# Patient Record
Sex: FEMALE | Race: OTHER | NOT HISPANIC OR LATINO | Employment: FULL TIME | URBAN - METROPOLITAN AREA
[De-identification: names, ages, dates, MRNs, and addresses within clinical notes are randomized per-mention and may not be internally consistent; named-entity substitution may affect disease eponyms.]

---

## 2018-05-18 ENCOUNTER — OFFICE VISIT (OUTPATIENT)
Dept: FAMILY MEDICINE | Facility: CLINIC | Age: 33
End: 2018-05-18
Payer: COMMERCIAL

## 2018-05-18 ENCOUNTER — LAB VISIT (OUTPATIENT)
Dept: LAB | Facility: HOSPITAL | Age: 33
End: 2018-05-18
Attending: FAMILY MEDICINE
Payer: COMMERCIAL

## 2018-05-18 VITALS
OXYGEN SATURATION: 96 % | DIASTOLIC BLOOD PRESSURE: 72 MMHG | HEIGHT: 60 IN | RESPIRATION RATE: 17 BRPM | HEART RATE: 85 BPM | SYSTOLIC BLOOD PRESSURE: 110 MMHG | WEIGHT: 115.5 LBS | TEMPERATURE: 99 F | BODY MASS INDEX: 22.68 KG/M2

## 2018-05-18 DIAGNOSIS — Z91.09 HOUSE DUST MITE ALLERGY: ICD-10-CM

## 2018-05-18 DIAGNOSIS — Z13.220 ENCOUNTER FOR LIPID SCREENING FOR CARDIOVASCULAR DISEASE: ICD-10-CM

## 2018-05-18 DIAGNOSIS — Z86.39 HISTORY OF HYPERLIPIDEMIA: ICD-10-CM

## 2018-05-18 DIAGNOSIS — Z13.6 ENCOUNTER FOR LIPID SCREENING FOR CARDIOVASCULAR DISEASE: ICD-10-CM

## 2018-05-18 DIAGNOSIS — Z00.00 ENCOUNTER FOR ROUTINE HISTORY AND PHYSICAL EXAM IN FEMALE PATIENT: ICD-10-CM

## 2018-05-18 DIAGNOSIS — Z00.00 ENCOUNTER FOR ROUTINE HISTORY AND PHYSICAL EXAM IN FEMALE PATIENT: Primary | ICD-10-CM

## 2018-05-18 PROBLEM — J30.2 SEASONAL ALLERGIES: Status: ACTIVE | Noted: 2018-05-18

## 2018-05-18 LAB
ALBUMIN SERPL BCP-MCNC: 3.7 G/DL
ALP SERPL-CCNC: 44 U/L
ALT SERPL W/O P-5'-P-CCNC: 11 U/L
ANION GAP SERPL CALC-SCNC: 9 MMOL/L
AST SERPL-CCNC: 17 U/L
BASOPHILS # BLD AUTO: 0.05 K/UL
BASOPHILS NFR BLD: 0.8 %
BILIRUB SERPL-MCNC: 0.4 MG/DL
BUN SERPL-MCNC: 13 MG/DL
CALCIUM SERPL-MCNC: 9.1 MG/DL
CHLORIDE SERPL-SCNC: 103 MMOL/L
CHOLEST SERPL-MCNC: 241 MG/DL
CHOLEST/HDLC SERPL: 3.7 {RATIO}
CO2 SERPL-SCNC: 26 MMOL/L
CREAT SERPL-MCNC: 0.8 MG/DL
DIFFERENTIAL METHOD: ABNORMAL
EOSINOPHIL # BLD AUTO: 0.3 K/UL
EOSINOPHIL NFR BLD: 5.1 %
ERYTHROCYTE [DISTWIDTH] IN BLOOD BY AUTOMATED COUNT: 12.7 %
EST. GFR  (AFRICAN AMERICAN): >60 ML/MIN/1.73 M^2
EST. GFR  (NON AFRICAN AMERICAN): >60 ML/MIN/1.73 M^2
GLUCOSE SERPL-MCNC: 86 MG/DL
HCT VFR BLD AUTO: 35.8 %
HDLC SERPL-MCNC: 65 MG/DL
HDLC SERPL: 27 %
HGB BLD-MCNC: 12 G/DL
LDLC SERPL CALC-MCNC: 146.4 MG/DL
LYMPHOCYTES # BLD AUTO: 1.7 K/UL
LYMPHOCYTES NFR BLD: 27.9 %
MCH RBC QN AUTO: 29.6 PG
MCHC RBC AUTO-ENTMCNC: 33.5 G/DL
MCV RBC AUTO: 88 FL
MONOCYTES # BLD AUTO: 0.5 K/UL
MONOCYTES NFR BLD: 8.1 %
NEUTROPHILS # BLD AUTO: 3.4 K/UL
NEUTROPHILS NFR BLD: 57.9 %
NONHDLC SERPL-MCNC: 176 MG/DL
PLATELET # BLD AUTO: 276 K/UL
PMV BLD AUTO: 10.2 FL
POTASSIUM SERPL-SCNC: 4.2 MMOL/L
PROT SERPL-MCNC: 7.5 G/DL
RBC # BLD AUTO: 4.06 M/UL
SODIUM SERPL-SCNC: 138 MMOL/L
TRIGL SERPL-MCNC: 148 MG/DL
TSH SERPL DL<=0.005 MIU/L-ACNC: 2.05 UIU/ML
WBC # BLD AUTO: 5.92 K/UL

## 2018-05-18 PROCEDURE — 80053 COMPREHEN METABOLIC PANEL: CPT

## 2018-05-18 PROCEDURE — 85025 COMPLETE CBC W/AUTO DIFF WBC: CPT

## 2018-05-18 PROCEDURE — 3008F BODY MASS INDEX DOCD: CPT | Mod: CPTII,S$GLB,, | Performed by: FAMILY MEDICINE

## 2018-05-18 PROCEDURE — 99385 PREV VISIT NEW AGE 18-39: CPT | Mod: S$GLB,,, | Performed by: FAMILY MEDICINE

## 2018-05-18 PROCEDURE — 99999 PR PBB SHADOW E&M-EST. PATIENT-LVL III: CPT | Mod: PBBFAC,,, | Performed by: FAMILY MEDICINE

## 2018-05-18 PROCEDURE — 36415 COLL VENOUS BLD VENIPUNCTURE: CPT | Mod: PN

## 2018-05-18 PROCEDURE — 84443 ASSAY THYROID STIM HORMONE: CPT

## 2018-05-18 PROCEDURE — 80061 LIPID PANEL: CPT

## 2018-05-18 RX ORDER — DIPHENHYDRAMINE HCL 50 MG
50 CAPSULE ORAL EVERY 6 HOURS PRN
Status: ON HOLD | COMMUNITY
End: 2020-04-02 | Stop reason: HOSPADM

## 2018-05-18 NOTE — PROGRESS NOTES
Chief Complaint   Patient presents with    Annual Exam       HPI    Alisa Grigsby is 33 y.o. female. The primary encounter diagnosis was Encounter for routine history and physical exam in female patient. Diagnoses of House dust mite allergy and History of hyperlipidemia were also pertinent to this visit.    33 year old female comes to clinic to establish care and for annual exam.  Patient reports Seasonal allergies and a dust mite allergy.  She reports nightly hives controlled with Benadryl.  She has tried mitigation techniques but has been unable to completely control and resolve the issue.    She reports a family history of HLD and a sister with Hypothyroidism.  She denies any other medical issues or concerns for discussion today.    Review of Systems   Constitutional: Negative for activity change and unexpected weight change.   HENT: Negative for hearing loss, rhinorrhea and trouble swallowing.    Eyes: Negative for discharge and visual disturbance.   Respiratory: Negative for chest tightness and wheezing.    Cardiovascular: Negative for chest pain and palpitations.   Gastrointestinal: Negative for blood in stool, constipation, diarrhea and vomiting.   Endocrine: Negative for polydipsia and polyuria.   Genitourinary: Negative for difficulty urinating, dysuria, hematuria and menstrual problem.   Musculoskeletal: Negative for arthralgias, joint swelling and neck pain.   Neurological: Negative for weakness and headaches.   Psychiatric/Behavioral: Negative for confusion and dysphoric mood.       Past Medical History:   Diagnosis Date    Allergy      History reviewed. No pertinent surgical history.  Family History   Problem Relation Age of Onset    Hyperlipidemia Mother     Hyperlipidemia Father     Hypothyroidism Sister     No Known Problems Brother     No Known Problems Daughter     Stroke Paternal Grandmother     Breast cancer Neg Hx     Colon cancer Neg Hx     Ovarian cancer Neg Hx       reports  "that she has never smoked. She has never used smokeless tobacco. She reports that she does not drink alcohol or use drugs.  Review of patient's allergies indicates:   Allergen Reactions    Ibuprofen          Current Outpatient Prescriptions:     diphenhydrAMINE (BENADRYL) 50 MG capsule, Take 50 mg by mouth every 6 (six) hours as needed for Itching., Disp: , Rfl:     norelgestromin-ethinyl estradiol (XULANE) 150-35 mcg/24 hr, Place 1 patch onto the skin every 7 days., Disp: 9 patch, Rfl: 4    valACYclovir (VALTREX) 1000 MG tablet, TAKE 2 TABLETS BY MOUTH EVERY 12 (TWELVE) HOURS., Disp: 30 tablet, Rfl: 5        Blood pressure 110/72, pulse 85, temperature 98.6 °F (37 °C), temperature source Oral, resp. rate 17, height 5' 0.24" (1.53 m), weight 52.4 kg (115 lb 8.3 oz), last menstrual period 05/09/2018, SpO2 96 %, currently breastfeeding.    Physical Exam   Constitutional: She is oriented to person, place, and time. Vital signs are normal. She appears well-developed.   HENT:   Right Ear: Hearing normal.   Left Ear: Hearing normal.   Mouth/Throat: Normal dentition.   Cardiovascular: Normal heart sounds and intact distal pulses.    Pulmonary/Chest: Effort normal and breath sounds normal.   Abdominal: Soft. Bowel sounds are normal. There is no tenderness.   Musculoskeletal:   Normal gait. No decreased ROM at all 4 major joints.   Neurological: She is oriented to person, place, and time. She has normal strength. No sensory deficit.   Skin: Skin is intact. No rash noted.   Psychiatric: She has a normal mood and affect. Her speech is normal.       Office Visit on 01/29/2018   Component Date Value Ref Range Status    SOURCE: 01/29/2018 Cervical   Final    Slides: 01/29/2018 1   Final    LMP: 01/29/2018 NOT GIVEN   Final    Specimen adequacy: 01/29/2018 (NOTE)   Final    Interpretation 01/29/2018 NO EPITHELIAL ABNORMALITY SEE BELOW   Final    Comments: 01/29/2018 (NOTE)   Final    Cytotechnologist: 01/29/2018 Cristobal " KEVIN Mcleod(ASCP)   Final    LOCATION 01/29/2018 (NOTE)   Final    CPT Codes: 01/29/2018 (NOTE)   Final    HPV HIGH RISK IF ASC-US, THINPREP 01/29/2018 CRITERIA NOT MET   Final   ]    ASSESSMENT:    1. Encounter for routine history and physical exam in female patient    2. House dust mite allergy    3. History of hyperlipidemia        Alisa was seen today for annual exam.    Diagnoses and all orders for this visit:    Encounter for routine history and physical exam in female patient  -     CBC auto differential; Future  -     Comprehensive metabolic panel; Future  -     TSH; Future  - Baseline labs ordered. Address any issues on lab work.     History of hyperlipidemia   -     Lipid panel; Future  -     TSH; Future  - New problem. Family and personal history of hyperlipidemia. Obtain lipids and adjust as needed.    House dust mite allergy    -New problem. Continue use of Benadryl nightly. Monitor for any worsening of allergy not controlled with antihistamine.        FOLLOW UP: Follow-up in about 1 year (around 5/18/2019) for Annual visit.

## 2018-05-18 NOTE — PATIENT INSTRUCTIONS
Prevention Guidelines, Women Ages 18 to 39  Screening tests and vaccines are an important part of managing your health. Health counseling is essential, too. Below are guidelines for these, for women ages 18 to 39. Talk with your healthcare provider to make sure youre up-to-date on what you need.  Screening Who needs it How often   Alcohol misuse All women in this age group At routine exams   Blood pressure All women in this age group Every 2 years if your blood pressure is less than 120/80 mm Hg; yearly if your systolic blood pressure is 120 to 139 mm Hg, or your diastolic blood pressure reading is 80 to 89 mm Hg   Breast cancer All women in this age group should talk with their healthcare providers about the need for clinical breast exams (CBE)1 Clinical breast exam every 3 years1   Cervical cancer Women ages 21 and older Women between ages 21 and 29 should have a Pap test every 3 years; women between ages 30 and 65 are advised to have a Pap test plus an HPV test every 5 years   Chlamydia Sexually active women ages 24 and younger, and women at increased risk for infection Every 3 years if you're at risk or have symptoms   Depression All women in this age group At routine exams   Diabetes mellitus, type 2 Adults with no symptoms who are overweight or obese and have 1 or more other risk factors for diabetes At least every 3 years   Gonorrhea Sexually active women at increased risk for infection At routine exams   Hepatitis C Anyone at increased risk At routine exams   HIV All women At routine exams   Obesity All women in this age group At routine exams   Syphilis Women at increased risk for infection - talk with your healthcare provider At routine exams   Tuberculosis Women at increased risk for infection - talk with your healthcare provider Ask your healthcare provider   Vision All women in this age group At least 1 complete exam in your 20s, and 2 in your 30s   Vaccine2 Who needs it How often   Chickenpox  (varicella) All women in this age group who have no record of this infection or vaccine 2 doses; the second dose should be given 4 to 8 weeks after the first dose   Hepatitis A Women at increased risk for infection - talk with your healthcare provider 2 doses given at least 6 months apart   Hepatitis B Women at increased risk for infection - talk with your healthcare provider 3 doses over 6 months; second dose should be given 1 month after the first dose; the third dose should be given at least 2 months after the second dose and at least 4 months after the first dose   Haemophilus influenzae Type B (HIB) Women at increased risk for infection - talk with your healthcare provider 1 to 3 doses   Human papillomavirus (HPV) All women in this age group up to age 26 3 doses; the second dose should be given 1 to 2 months after the first dose and the third dose given 6 months after the first dose   Influenza (flu) All women in this age group Once a year   Measles, mumps, rubella (MMR) All women in this age group who have no record of these infections or vaccines 1 or 2 doses   Meningococcal Women at increased risk for infection - talk with your healthcare provider 1 or more doses   Pneumococcal conjugate vaccine (PCV13) and pneumococcal polysaccharide vaccine (PPSV23) Women at increased risk for infection - talk with your healthcare provider PCV13: 1 dose ages 19 to 65 (protects against 13 types of pneumococcal bacteria)  PPSV23: 1 to 2 doses through age 64, or 1 dose at 65 or older (protects against 23 types of pneumococcal bacteria)      Tetanus/diphtheria/pertussis (Td/Tdap) booster All women in this age group Td every 10 years, or a one-time dose of Tdap instead of a Td booster after age 18, then Td every 10 years   Counseling Who needs it How often   BRCA gene mutation testing for breast and ovarian cancer susceptibility Women with increased risk for having gene mutation When your risk is known   Breast cancer and  chemoprevention Women at high risk for breast cancer When your risk is known   Diet and exercise Women who are overweight or obese When diagnosed, and then at routine exams   Domestic violence Women at the age in which they are able to have children At routine exams   Sexually transmitted infection prevention Women who are sexually active At routine exams   Skin cancer Prevention of skin cancer in fair-skinned adults through age 24 At routine exams   Use of tobacco and the health effects it can cause All women in this age group Every visit   1According to the ACS, women ages 20 to 39 years should have a clinical breast exam (CBE) as part of their routine health exam every 3 years. Breast self-exams are an option for women starting in their 20s. But the  USPSTF does not recommend CBE.  2Those who are 18 years old and not up-to-date on their childhood vaccines should get all appropriate catch-up vaccines recommended by the CDC.  Date Last Reviewed: 8/27/2015  © 0716-4161 [employer] [address] [place] [address], [place] [address]. All rights reserved. This information is not intended as a substitute for professional medical care. Always follow your healthcare professional's instructions.

## 2018-05-29 ENCOUNTER — PATIENT MESSAGE (OUTPATIENT)
Dept: FAMILY MEDICINE | Facility: CLINIC | Age: 33
End: 2018-05-29

## 2018-08-13 ENCOUNTER — PATIENT MESSAGE (OUTPATIENT)
Dept: FAMILY MEDICINE | Facility: CLINIC | Age: 33
End: 2018-08-13

## 2018-08-13 DIAGNOSIS — Z91.09 HOUSE DUST MITE ALLERGY: Primary | ICD-10-CM

## 2018-08-13 RX ORDER — TRIAMCINOLONE ACETONIDE 5 MG/G
OINTMENT TOPICAL 2 TIMES DAILY
Qty: 60 G | Refills: 2 | Status: SHIPPED | OUTPATIENT
Start: 2018-08-13 | End: 2024-03-09

## 2018-12-07 ENCOUNTER — PATIENT MESSAGE (OUTPATIENT)
Dept: FAMILY MEDICINE | Facility: CLINIC | Age: 33
End: 2018-12-07

## 2018-12-07 RX ORDER — PENCICLOVIR 10 MG/G
CREAM TOPICAL
Qty: 5 G | Refills: 2 | Status: SHIPPED | OUTPATIENT
Start: 2018-12-07

## 2019-08-14 ENCOUNTER — OFFICE VISIT (OUTPATIENT)
Dept: FAMILY MEDICINE | Facility: CLINIC | Age: 34
End: 2019-08-14
Payer: COMMERCIAL

## 2019-08-14 VITALS
HEIGHT: 60 IN | DIASTOLIC BLOOD PRESSURE: 69 MMHG | HEART RATE: 87 BPM | SYSTOLIC BLOOD PRESSURE: 107 MMHG | TEMPERATURE: 99 F | OXYGEN SATURATION: 98 % | BODY MASS INDEX: 22.47 KG/M2 | RESPIRATION RATE: 17 BRPM | WEIGHT: 114.44 LBS

## 2019-08-14 DIAGNOSIS — Z00.00 WELL WOMAN EXAM (NO GYNECOLOGICAL EXAM): Primary | ICD-10-CM

## 2019-08-14 PROCEDURE — 99395 PR PREVENTIVE VISIT,EST,18-39: ICD-10-PCS | Mod: S$GLB,,, | Performed by: FAMILY MEDICINE

## 2019-08-14 PROCEDURE — 99395 PREV VISIT EST AGE 18-39: CPT | Mod: S$GLB,,, | Performed by: FAMILY MEDICINE

## 2019-08-14 PROCEDURE — 99999 PR PBB SHADOW E&M-EST. PATIENT-LVL III: ICD-10-PCS | Mod: PBBFAC,,, | Performed by: FAMILY MEDICINE

## 2019-08-14 PROCEDURE — 99999 PR PBB SHADOW E&M-EST. PATIENT-LVL III: CPT | Mod: PBBFAC,,, | Performed by: FAMILY MEDICINE

## 2019-08-14 NOTE — PROGRESS NOTES
"Well Woman VISIT      CHIEF COMPLAINT  Chief Complaint   Patient presents with    [facility] Care    Annual Exam       HPI  Alisa Grigsby is a 34 y.o. female who presents for physical.     Social Factors  Tobacco use: No  Ready to Quit: No  Alcohol: No  Intimate partner violence screening  "Do you feel safe in your current relationship?" Yes   "Have you ever been in a relationship in which your partner frightened you or hurt you?" No  Living Will/POA: No  Regular Exercise: No    Depression  Over the past two weeks, have you felt down, depressed, or hopeless? No  Over the past two weeks, have you felt little interest or pleasure in doing things? No    Reproductive Health  Followed by Dr. Graff    CHD, HTN, DM2  CHD Risk Factors: dyslipidemia  Women 45 years and older should be screened for dyslipidemia if at increased risk of CHD  Women 20 to 45 years of age should be screened for dyslipidemia if at increased risk of CHD  Asymptomatic adults with sustained blood pressure greater than 135/80 mm Hg (treated or untreated) should be screened for type 2 diabetes mellitus    Estimated body mass index is 22.17 kg/m² as calculated from the following:    Height as of this encounter: 5' 0.24" (1.53 m).    Weight as of this encounter: 51.9 kg (114 lb 6.7 oz).      Screening  Mammogram needed: n/a  Colonoscopy needed: n/a  Osteoporosis screen needed: n/a     Women 50 to 74 years of age should be screened for breast cancer with mammography biennially.  Women should be screened for cervical cancer with Pap tests beginning at 21 years of age. Low-risk women should receive Pap testing every three years. Co-testing for human papillomavirus is an option beginning at 30 years of age, and can extend the screening interval to five years. Cervical cancer screening should be discontinued at 65 years of age or after total hysterectomy if the woman has a benign gynecologic history  Adults 50 to 75 years of age should be screened " "for colorectal cancer with an FOBT annually, sigmoidoscopy every five years with an FOBT every three years, or colonoscopy every 10 years.  Women 65 years and older should be screened for osteoporosis. Women younger than 65 years should be screened if the risk of fracture is greater than or equal to that of a 65-year-old white woman without additional risk factors.      Immunizations  delayed    ALLERGIES and MEDS were verified.   PMHx, PSHx, FHx, SOCIALHx were updated as pertinent.    REVIEW OF SYSTEMS  Review of Systems   Constitutional: Negative.    HENT: Negative for hearing loss.    Eyes: Negative for discharge.   Respiratory: Negative for wheezing.    Cardiovascular: Negative for chest pain and palpitations.   Gastrointestinal: Negative for blood in stool, constipation, diarrhea and vomiting.   Genitourinary: Negative for dysuria and hematuria.   Musculoskeletal: Negative for neck pain.   Skin: Negative.    Neurological: Negative for weakness and headaches.   Endo/Heme/Allergies: Negative for polydipsia.         PHYSICAL EXAM  VITAL SIGNS: /69 (BP Location: Left arm, Patient Position: Sitting, BP Method: Medium (Automatic))   Pulse 87   Temp 98.8 °F (37.1 °C) (Oral)   Resp 17   Ht 5' 0.24" (1.53 m)   Wt 51.9 kg (114 lb 6.7 oz)   LMP 07/19/2019   SpO2 98%   BMI 22.17 kg/m²   GEN: Well developed, Well nourished, No acute distress.  HENT: Normocephalic, Atraumatic, Bilateral external ears normal, Nose normal, Oropharynx moist, No oral exudates.   Eyes: PERRL, EOMI, Conjunctiva normal, No discharge.   Neck: Supple, No tenderness.  Lymphatic: No cervical or supraclavicular lymphadenopathy noted.   Cardiovascular: Normal heart rate, Normal rhythm, No murmurs, No rubs, No gallops.   Thorax & Lungs: Normal breath sounds, No respiratory distress, No wheezing.  Abdomen: Soft, No tenderness, Bowel sounds normal.  Breast: deferred  Genital: deferred   Skin: Warm, Dry, No erythema, No rash.   Extremities: No " edema, No tenderness.       ASSESSMENT/PLAN    Alisa was seen today for establish care and annual exam.    Diagnoses and all orders for this visit:    Well woman exam (no gynecological exam)  -     CBC auto differential; Future  -     Comprehensive metabolic panel; Future  -     Lipid panel; Future  -     Hemoglobin A1c; Future  -     Urinalysis; Future  -     TSH; Future           FOLLOW UP: 1 year or sooner if needed      Gwyn Stein MD

## 2019-09-20 PROBLEM — B00.2 PRIMARY HSV INFECTION OF MOUTH: Status: ACTIVE | Noted: 2019-09-20

## 2019-12-06 PROBLEM — O44.02 PLACENTA PREVIA IN SECOND TRIMESTER: Status: ACTIVE | Noted: 2019-12-06

## 2019-12-10 DIAGNOSIS — Z36.89 ENCOUNTER FOR FETAL ANATOMIC SURVEY: Primary | ICD-10-CM

## 2019-12-16 ENCOUNTER — INITIAL CONSULT (OUTPATIENT)
Dept: MATERNAL FETAL MEDICINE | Facility: CLINIC | Age: 34
End: 2019-12-16
Payer: COMMERCIAL

## 2019-12-16 VITALS — WEIGHT: 130.06 LBS | SYSTOLIC BLOOD PRESSURE: 116 MMHG | BODY MASS INDEX: 25.2 KG/M2 | DIASTOLIC BLOOD PRESSURE: 71 MMHG

## 2019-12-16 DIAGNOSIS — Z36.89 ENCOUNTER FOR FETAL ANATOMIC SURVEY: ICD-10-CM

## 2019-12-16 DIAGNOSIS — O44.02 PLACENTA PREVIA IN SECOND TRIMESTER: ICD-10-CM

## 2019-12-16 PROCEDURE — 99999 PR PBB SHADOW E&M-EST. PATIENT-LVL II: CPT | Mod: PBBFAC,,, | Performed by: OBSTETRICS & GYNECOLOGY

## 2019-12-16 PROCEDURE — 76805 OB US >/= 14 WKS SNGL FETUS: CPT | Mod: S$GLB,,, | Performed by: OBSTETRICS & GYNECOLOGY

## 2019-12-16 PROCEDURE — 76805 PR US, OB 14+WKS, TRANSABD, SINGLE GESTATION: ICD-10-PCS | Mod: S$GLB,,, | Performed by: OBSTETRICS & GYNECOLOGY

## 2019-12-16 PROCEDURE — 99202 PR OFFICE/OUTPT VISIT, NEW, LEVL II, 15-29 MIN: ICD-10-PCS | Mod: 25,S$GLB,, | Performed by: OBSTETRICS & GYNECOLOGY

## 2019-12-16 PROCEDURE — 99999 PR PBB SHADOW E&M-EST. PATIENT-LVL II: ICD-10-PCS | Mod: PBBFAC,,, | Performed by: OBSTETRICS & GYNECOLOGY

## 2019-12-16 PROCEDURE — 99202 OFFICE O/P NEW SF 15 MIN: CPT | Mod: 25,S$GLB,, | Performed by: OBSTETRICS & GYNECOLOGY

## 2019-12-16 NOTE — LETTER
December 16, 2019      Cristobal Martin MD  [address]  [address]  [place] [address]           MATERNAL AND FETAL MEDICINE  [address]  [place] [address]  Phone: [phone]  Fax: [phone]          Patient: Alisa Grigsby   MR Number: [id number]   YOB: 1985   Date of Visit: 12/16/2019       Dear Dr. Cristobal Martin:    Thank you for referring Alisa Grigsby to me for evaluation. Attached you will find relevant portions of my assessment and plan of care.    If you have questions, please do not hesitate to call me. I look forward to following Alisa Grigsby along with you.    Sincerely,    Wanda Douglas MD    Enclosure  CC:  No Recipients    If you would like to receive this communication electronically, please contact [email or web address] or [phone] to request more information on [employer] access.    For providers and/or their staff who would like to refer a patient to [facility], please contact us through our one-stop-shop provider referral line, [facility], at [phone].    If you feel you have received this communication in error or would no longer like to receive these types of communications, please e-mail [email or web address]

## 2019-12-17 NOTE — PROGRESS NOTES
Indication  ========    Consultation. Evaluation for placenta previa. Fetal anatomy survey    History  ======    Previous Outcomes   2  Para 1    Maternal Assessment  =================    BP syst 116 mmHg  BP diast 71 mmHg    Method  ======    Transabdominal ultrasound examination, 2D Color Doppler, Voluson E6, . View: Good view    Pregnancy  =========    Pringle pregnancy. Number of fetuses: 1    Dating  ======    Cycle: regular cycle, regular cycle  GA by prior assessment 21 w + 4 d  CLEO by prior assessment: 2020  Ultrasound examination on: 2019  GA by U/S based upon: AC, BPD, Femur, HC  GA by U/S 22 w + 0 d  CLEO by U/S: 2020  Assigned: based on stated CLEO, selected on 2019  Assigned GA 21 w + 4 d  Assigned CLEO: 2020  Pregnancy length 280 d    General Evaluation  ==============    Cardiac activity present.  bpm.  Fetal movements visualized.  Presentation breech.  Placenta Placental site: complete previa. (see impression).  Umbilical cord Cord vessels: 3 vessel cord.  Amniotic fluid Amount of AF: normal amount.    Fetal Biometry  ============    Standard  BPD 53.2 mm  22w 1d                Hadlock    OFD 65.4 mm  22w 1d                Mai    .3 mm  21w 2d                Hadlock    Cerebellum tr 23.0 mm  22w 2d                Deluca    .0 mm  23w 0d                Hadlock    Femur 35.7 mm  21w 2d                Hadlock    Humerus 34.8 mm  21w 6d                Mai    HC / AC 1.05     g          52%        Alphonso    EFW (lb) 1 lb  EFW (oz) 1 oz  EFW by: Hadlock (BPD-HC-AC-FL)  Extended   8.1 mm  CM 4.9 mm    Head / Face / Neck  Cephalic index 0.81    Extremities / Bony Struc  FL / BPD 0.67    FL / HC 0.19    FL / AC 0.20    Other Structures   bpm    Fetal Anatomy  ============    Cranium: normal  Lateral ventricles: normal  Choroid plexus: normal  Midline falx: normal  Cavum septi pellucidi: normal  Cerebellum: normal  Cisterna  magna: normal  Head / Neck  Vermis: normal  Neck: normal  Nuchal fold: not examined  Lips: normal  Profile: normal  Nose: normal  RVOT view: suboptimal  LVOT view: normal  Heart / Thorax  4-chamber view: 4-chamber normal, septum normal  Situs: situs solitus (normal)  Aortic arch view: normal  Ductal arch view: normal  SVC: normal  IVC: normal  3-vessel view: normal  3-vessel-trachea view: suboptimal  Rt lung: normal  Lt lung: normal  Diaphragm: normal  Cord insertion: normal  Stomach: normal  Kidneys: normal  Bladder: normal  Genitals: normal  Abdomen  Liver: normal  Bowel: normal  Rt renal artery: normal  Lt renal artery: normal  Cervical spine: normal  Thoracic spine: normal  Lumbar spine: normal  Sacral spine: normal  Rt arm: normal  Lt arm: normal  Rt leg: normal  Lt leg: normal  Position of hands: normal  Position of feet: normal  Gender: male  Wants to know gender: yes    Maternal Structures  ===============    Uterus / Cervix  Uterus: Normal  Cervical length 33.0 mm  Ovaries / Tubes / Adnexa  Rt ovary: Normal  Lt ovary: Normal    Consultation  ==========    Type: Complete previa  The patient is a 34-year-old  001 who is currently at 21 weeks and 4 days gestation based on LMP agreeing with a 10 week ultrasound  who presents as a consultation from Dr. anderson in due to a complete percent of previa. The patient did not reveal any complications thus far with  the pregnancy.    Past medical history: None per patient  past surgical history: [place] teeth extraction  social: Denies smoking, alcohol this drug use  family history of birth defects: None- the father of the baby has Factor 9 deficiency which is severe, his two sisters have lupus and Sjogren's  respectively and his father is diabetic, couple's daughter is healthy - their pediatrician is aware of his factor 9 deficiency and that the daughter  should be a carrier of this. The patient and  were informed that carriers can have symptoms in issues and  also concerns with respect  to pregnancy and recommend the address this with their pediatrician.  Allergies: Ibuprofen, NSAIDs which leads to swelling around the eyes  medications: Prenatal vitamins, promethazine  past OB history: Spontaneous vaginal delivery full term in 2015 6 lb 10.9 oz  low probability NIPT which was male, no MSAFP only recorded, cystic fibrosis negative, negative hemoglobin electrophoresis, A1c 5.2    assessment and plan:    1. Intrauterine pregnancy at 21 and 4 weeks    2. Complete placenta previa: I discussed with the patient that I recommend pelvic rest currently. I would not recommend any heavy aerobic  activity. I do not recommend bedrest as this promote the risk of thromboembolism. I discussed with her she should notify her primary  physicians if there are any evidence of bleeding or concerns. She was given bleeding precautions. I discussed with her the potential for the  placenta to move as gestation increases. However, I discussed with her that at times previas do not move location. She does have a complete  placenta previa, however there is a Lake seen near the cervix and as the placenta migrated if this area becomes thinner than fetal vessels may  be exposed near the cervix possibly and would need to be treated as a vasa previa. This may turn into a situation similar to a bilobed placenta.  I discussed with her that we will follow this as pregnancy progresses. There is currently a thin rim of placental tissue over the cervix currently  and fetal vessels are not exposed. I would recommend a follow-up ultrasound in 4-5 weeks at which time will do a transvaginal ultrasound to  better assess the placental appearance and location. The patient is aware that if the placenta previa persists later in the pregnancy delivery  would be recommended at 36-37 weeks of gestation or earlier if indicated. If the Vasa previa evolves, earlier delivery is typically recommended  most often around 34-35  weeks depending on the clinical circumstances. Risks of vasa previa were reviewed. Risks of invasive placentation  with placenta previa were also reviewed as was risk of hysterectomy and potential need for blood transfusion. The patient had questions about  traveling to [place]. I advised her that if she goes to [place] she should have frequent breaks from the car ride to ambulate. I advised her to  bring her prenatal records. I also advised her that it is possible that if something occurs that she could remain in [place] for the remainder of  the pregnancy. She should further discuss her trip with her primary obstetricians. The patient is aware that further follow-up ultrasounds may be  needed subsequent to the next ultrasound. Delivery for previa or vasa previa is via csection.    3. Family history of hemophilia. I would recommend the primary OB notify the pediatricians at delivery. Theoretically, a male fetus should not  receive hemophilia transmission from a father. However, there can be new mutations that can occur. I also advised him to be sure that the  pediatrician is aware for the risks further daughter.    4. Primary OB should be sure that patient has been offered MSAFP only and diagnostic testing for aneuploidy.    5. Primary ob managing HSV of mouth and other medical issues.    30 min was spent face-to-face time with greater than half the time spent counseling and coordination of care.    Impression  =========    Pringle live intrauterine pregnancy.  Biometry agrees with the clinical dating.  Normal amniotic fluid volume by qualitative assessment.  Some of the fetal anatomy was suboptimally visualized. The fetal anatomy that was seen appeared normal.  Placenta is complete central previa.There is a prominent lake in the placenta near the fetal cervix. Just below this lake is a thin rim of placenta.  On top of the lake are fetal vessels that course across the placenta. PCI is posterior up higher on the  placenta.  Transabdominal cervical length is normal.    Recommendation  ==============    Follow up ultrasound in 4-5 weeks for growth/suboptimal anatomy/assess placenta with TV scan.  Pelvic rest/bleeding precautions.  Reviewed concerns of travel.  Primary ob should be sure patient was offered MSAFP only and diagnostic testing for aneuploidy.  Recommend primary ob notify peds of FOB's hemophilia and advised patient to discuss her daughter's status with her pediatrician.  Timing of subsequent ultrasounds and delivery planning to depend on follow up scans.  See note above.

## 2020-01-22 ENCOUNTER — INITIAL CONSULT (OUTPATIENT)
Dept: MATERNAL FETAL MEDICINE | Facility: CLINIC | Age: 35
End: 2020-01-22
Payer: COMMERCIAL

## 2020-01-22 ENCOUNTER — PROCEDURE VISIT (OUTPATIENT)
Dept: MATERNAL FETAL MEDICINE | Facility: CLINIC | Age: 35
End: 2020-01-22
Payer: COMMERCIAL

## 2020-01-22 VITALS
DIASTOLIC BLOOD PRESSURE: 65 MMHG | BODY MASS INDEX: 26.91 KG/M2 | WEIGHT: 138.88 LBS | SYSTOLIC BLOOD PRESSURE: 118 MMHG

## 2020-01-22 DIAGNOSIS — Z36.89 ENCOUNTER FOR ULTRASOUND TO CHECK FETAL GROWTH: ICD-10-CM

## 2020-01-22 DIAGNOSIS — O44.02 PLACENTA PREVIA IN SECOND TRIMESTER: ICD-10-CM

## 2020-01-22 DIAGNOSIS — O26.92 PREGNANCY, COMPLICATIONS OF, SECOND TRIMESTER: ICD-10-CM

## 2020-01-22 PROCEDURE — 99999 PR PBB SHADOW E&M-EST. PATIENT-LVL I: CPT | Mod: PBBFAC,,, | Performed by: OBSTETRICS & GYNECOLOGY

## 2020-01-22 PROCEDURE — 99213 OFFICE O/P EST LOW 20 MIN: CPT | Mod: 25,S$GLB,, | Performed by: OBSTETRICS & GYNECOLOGY

## 2020-01-22 PROCEDURE — 99999 PR PBB SHADOW E&M-EST. PATIENT-LVL I: ICD-10-PCS | Mod: PBBFAC,,, | Performed by: OBSTETRICS & GYNECOLOGY

## 2020-01-22 PROCEDURE — 76816 PR  US,PREGNANT UTERUS,F/U,TRANSABD APP: ICD-10-PCS | Mod: S$GLB,,, | Performed by: OBSTETRICS & GYNECOLOGY

## 2020-01-22 PROCEDURE — 76817 TRANSVAGINAL US OBSTETRIC: CPT | Mod: S$GLB,,, | Performed by: OBSTETRICS & GYNECOLOGY

## 2020-01-22 PROCEDURE — 99213 PR OFFICE/OUTPT VISIT, EST, LEVL III, 20-29 MIN: ICD-10-PCS | Mod: 25,S$GLB,, | Performed by: OBSTETRICS & GYNECOLOGY

## 2020-01-22 PROCEDURE — 76816 OB US FOLLOW-UP PER FETUS: CPT | Mod: S$GLB,,, | Performed by: OBSTETRICS & GYNECOLOGY

## 2020-01-22 PROCEDURE — 76817 PR US, OB, TRANSVAG APPROACH: ICD-10-PCS | Mod: S$GLB,,, | Performed by: OBSTETRICS & GYNECOLOGY

## 2020-01-22 PROCEDURE — 3008F BODY MASS INDEX DOCD: CPT | Mod: CPTII,S$GLB,, | Performed by: OBSTETRICS & GYNECOLOGY

## 2020-01-22 PROCEDURE — 3008F PR BODY MASS INDEX (BMI) DOCUMENTED: ICD-10-PCS | Mod: CPTII,S$GLB,, | Performed by: OBSTETRICS & GYNECOLOGY

## 2020-01-23 NOTE — PROGRESS NOTES
Indication  ========    Follow-up evaluation for fetal growth. Follow-up evaluation for placenta previa. F/U Consultation    History  ======    Previous Outcomes   2  Para 1    Maternal Assessment  =================    BP syst 118 mmHg  BP diast 65 mmHg    Fetal Growth Overview  =================    Exam date        GA              BPD (mm)         HC (mm)        AC (mm)        FL (mm)         HL (mm)        EFW (g)  2019        21w 4d        53.2                  190.3             182.0             35.7              34.8               481    52%  2020        26w 6d        69.0                  252.9             228.9             48.7                                   1,013    50%      Method  ======    Transabdominal and transvaginal ultrasound examination, 2D Color Doppler, Voluson S8. View: Good view    Pregnancy  =========    Pringle pregnancy. Number of fetuses: 1    Dating  ======    Cycle: regular cycle, regular cycle  GA by prior assessment 26 w + 6 d  CLEO by prior assessment: 2020  Ultrasound examination on: 2020  GA by U/S based upon: AC, BPD, Femur, HC  GA by U/S 27 w + 2 d  CLEO by U/S: 2020  Assigned: based on stated CLEO, selected on 2019  Assigned GA 26 w + 6 d  Assigned CLEO: 2020  Pregnancy length 280 d    General Evaluation  ==============    Cardiac activity present.  bpm.  Fetal movements visualized.  Presentation cephalic.  Placenta Placental site: previa.  Umbilical cord Cord vessels: 3 vessel cord.  Amniotic fluid normal.    Fetal Biometry  ============    Standard  BPD 69.0 mm  27w 5d                Hadlock    OFD 88.4 mm  28w 3d                Mai    .9 mm  27w 3d                Hadlock    Cerebellum tr 30.3 mm  27w 4d                Deluca    .9 mm  27w 2d                Hadlock    Femur 48.7 mm  26w 3d                Hadlock    HC / AC 1.10    EFW 1,013 g          50%        Alphonso    EFW (lb) 2 lb  EFW (oz) 4 oz  EFW  by: Hadlock (BPD-HC-AC-FL)  Extended   4.7 mm  CM 6.5 mm    Head / Face / Neck  Cephalic index 0.78    Extremities / Bony Struc  FL / BPD 0.71    FL / AC 0.21    Other Structures   bpm    Fetal Anatomy  ============    Cranium: appears normal  Profile: normal  4-chamber view: normal  RVOT view: normal  Heart / Thorax  3-vessel-trachea view: normal  Stomach: normal  Kidneys: normal  Bladder: normal  Gender: male  Wants to know gender: yes  Other: A full anatomic survey has been previously performed.    Maternal Structures  ===============    Uterus / Cervix  Approach: Transvaginal  Cervical length 29.0 mm  Second approach: Transvaginal  Cervical length (2) 33.0 mm    Consultation  ==========    Type: Previa  She does have a complete placenta previa, however there is a lake seen near the cervix and as the placenta migrates if this area becomes  thinner than fetal vessels may be exposed near the cervix possibly and would need to be treated as a vasa previa. I discussed with her that we  will follow this as pregnancy progresses. There is currently a very thin rim of placental tissue adjacent to the cervix currently and fetal vessels  are not exposed but are just above this thin rim. Placental tissue is directly over the cervical os but is not substantial. I would recommend a  follow-up ultrasound in 4 weeks at which time will do a transvaginal ultrasound to better assess the placental appearance and location. The  patient is aware that if the placenta previa persists later in the pregnancy delivery would be recommended at 36-37 weeks of gestation or earlier  if indicated. If the Vasa previa evolves, earlier delivery is typically recommended most often around 34-35 weeks depending on the clinical  circumstances. Risks of vasa previa were reviewed. Risks of invasive placentation with placenta previa were also reviewed as was risk of  hysterectomy and potential need for blood transfusion. Delivery is via csection  with placenta previa or vasa previa. Patient aware area of  placenta adjacent to cervix is very thin ( placenta is essentially bilobed and will likely take on that appearance as pregnancy progresses).  Transvaginal cervical length this in the lower normal range. The cervix appears closed. The patient was given appropriate precautions. No  current signs of accreta. Risks of invasive placentation were reviewed. 15 min was spent face-to-face time with greater than half that time spent  in counseling and coordination of care.    Impression  =========    Pringle live intrauterine pregnancy.  Overall normal fetal growth.  Normal amniotic fluid volume.  The previously suboptimally visualized fetal anatomy was seen today and appeared normal.  Limited fetal anatomy appears normal.  On transvaginal ultrasound there is a complete previa with anterior and posterior portions connected by a very thin rim of placental tissue with  fetal vessels just superior to that. No obvious signs of accreta.  Transvaginal cervical length is lower normal range at 29 to 33 mm. The internal os is closed.    Recommendation  ==============    Follow up ultrasound in 4 weeks for growth and TV scan to assess the placenta.  Precautions given/advised pelvic rest.

## 2020-02-11 DIAGNOSIS — Z36.89 ENCOUNTER FOR FETAL ANATOMIC SURVEY: Primary | ICD-10-CM

## 2020-02-26 ENCOUNTER — PROCEDURE VISIT (OUTPATIENT)
Dept: MATERNAL FETAL MEDICINE | Facility: CLINIC | Age: 35
End: 2020-02-26
Payer: COMMERCIAL

## 2020-02-26 VITALS
SYSTOLIC BLOOD PRESSURE: 110 MMHG | BODY MASS INDEX: 28.11 KG/M2 | DIASTOLIC BLOOD PRESSURE: 69 MMHG | WEIGHT: 145.06 LBS

## 2020-02-26 DIAGNOSIS — O44.03 PLACENTA PREVIA ANTEPARTUM IN THIRD TRIMESTER: ICD-10-CM

## 2020-02-26 DIAGNOSIS — Z36.89 ENCOUNTER FOR ULTRASOUND TO CHECK FETAL GROWTH: Primary | ICD-10-CM

## 2020-02-26 DIAGNOSIS — Z36.89 ENCOUNTER FOR FETAL ANATOMIC SURVEY: ICD-10-CM

## 2020-02-26 DIAGNOSIS — Z36.89 ENCOUNTER FOR ULTRASOUND TO CHECK FETAL GROWTH: ICD-10-CM

## 2020-02-26 PROCEDURE — 76816 PR  US,PREGNANT UTERUS,F/U,TRANSABD APP: ICD-10-PCS | Mod: S$GLB,,, | Performed by: OBSTETRICS & GYNECOLOGY

## 2020-02-26 PROCEDURE — 76817 PR US, OB, TRANSVAG APPROACH: ICD-10-PCS | Mod: S$GLB,,, | Performed by: OBSTETRICS & GYNECOLOGY

## 2020-02-26 PROCEDURE — 76816 OB US FOLLOW-UP PER FETUS: CPT | Mod: S$GLB,,, | Performed by: OBSTETRICS & GYNECOLOGY

## 2020-02-26 PROCEDURE — 99203 PR OFFICE/OUTPT VISIT, NEW, LEVL III, 30-44 MIN: ICD-10-PCS | Mod: 25,S$GLB,, | Performed by: OBSTETRICS & GYNECOLOGY

## 2020-02-26 PROCEDURE — 76817 TRANSVAGINAL US OBSTETRIC: CPT | Mod: S$GLB,,, | Performed by: OBSTETRICS & GYNECOLOGY

## 2020-02-26 PROCEDURE — 99203 OFFICE O/P NEW LOW 30 MIN: CPT | Mod: 25,S$GLB,, | Performed by: OBSTETRICS & GYNECOLOGY

## 2020-02-26 NOTE — PROGRESS NOTES
Indication  ========    Evaluation of fetal growth/Placenta Location    History  ======    Previous Outcomes   2  Para 1    Maternal Assessment  =================    Weight 66 kg  Weight (lb) 146 lb  BP syst 110 mmHg  BP diast 69 mmHg    Fetal Growth Overview  =================    Exam date        GA              BPD (mm)         HC (mm)        AC (mm)        FL (mm)         HL (mm)        EFW (g)  2019        21w 4d        53.2                  190.3             182.0             35.7              34.8               481    52%  2020        26w 6d        69.0                  252.9             228.9             48.7                                   1,013    50%  2020        31w 6d        79.7                  306.7             300.9             60.1                                   2,117    47%      Method  ======    Transabdominal and transvaginal ultrasound examination, 2D Color Doppler, Voluson E6, . View: Good view    Pregnancy  =========    Pringle pregnancy. Number of fetuses: 1    Dating  ======    Cycle: regular cycle,  GA by prior assessment 31 w + 6 d  CLEO by prior assessment: 2020  Ultrasound examination on: 2020  GA by U/S based upon: AC, BPD, Femur, HC  GA by U/S 32 w + 6 d  CLEO by U/S: 2020  Assigned: based on stated CLEO, selected on 2019  Assigned GA 31 w + 6 d  Assigned CLEO: 2020  Pregnancy length 280 d    General Evaluation  ==============    Cardiac activity present.  bpm.  Fetal movements visualized.  Presentation cephalic.  Placenta Placental site: previa.  Umbilical cord 3 vessel cord.  Amniotic fluid Amount of AF: normal amount. MVP 5.2 cm. DELFIN 16.3 cm. Q1 4.3 cm, Q2 2.6 cm, Q3 5.2 cm, Q4 4.1 cm.    Fetal Biometry  ============    Standard  BPD 79.7 mm  32w 0d                Hadlock    .0 mm  36w 0d                Mai    .7 mm  34w 1d                Hadlock    .9 mm  34w 0d                Hadlock    Femur 60.1  mm  31w 2d                Hadlock    HC / AC 1.02    EFW 2,117 g          47%        Alphonso    EFW (lb) 4 lb  EFW (oz) 11 oz  EFW by: Hadlock (BPD-HC-AC-FL)  Head / Face / Neck  Cephalic index 0.73    Extremities / Bony Struc  FL / BPD 0.75    FL / HC 0.20    FL / AC 0.20    Other Structures   bpm    Fetal Anatomy  ===========    Cranium: normal  4-chamber view: normal  Stomach: normal  Kidneys: normal  Bladder: normal  Gender: male  Wants to know gender: yes    Maternal Structures  ===============    Uterus / Cervix  Cervical length 34.6 mm    Consultation  ==========    Type: F/u previa  The patient denies any vaginal bleeding, leaking of fluid, cramping or contractions. She has followed pelvic rest. She was advised to continue to  follow pelvic rest and given precautions.Complete placenta previa is noted-the placenta is over the internal cervical os with a a placental lake  just to the right of the placenta that is covering the cervix. The fetal umbilical cord is over the lake but there is 2.5cm of depth between the cord  and the uteroplacental interface.  This is not a vasa previa but as pregnancy progresses the lake may stretch and cover the internal os with fetal vessels just over the lake and  needs to be monitored.  I discussed with the patient that we would reassess the fetal growth and do a transvaginal ultrasound in 2-3 weeks to determine delivery timing.  I discussed with the patient that if the placenta still appears to be covering the os as a complete previa, delivery is recommended between 36  and 37 weeks via . We would recommend late  betamethasone if there are no contraindications if the patient delivers prior to  37 weeks. If the placental lake appears closer to the cervical os or appears to be thinning in this region, delivery would likely be recommended  earlier around 35 weeks of pregnancy. The patient is aware if she has any vaginal bleeding or concerns that she would  need to present to the  hospital and contact her physicians. She is aware the of the potential for this lake area to stretch over the cervix and just be a lake over the  cervix rather than placental tissue and the potential implications.    The fetal AC measures ahead. The patient had normal gestational diabetes screening in January. Recommend primary OB repeat GDM  screening.    Recommend primary OB type and cross for delivery and notify the pediatricians of the family history and the patient's ultrasound.    The patient is aware of the risks of delivery prior to 39 weeks and risks of prematurity. She inquired if she should deliver at [facility]. I discussed  with her the availability of the services here at [facility] and that previas are able to be delivered at [facility]. If she or her physician feels  uncomfortable with her delivering at [facility], then she can transfer to a general ob/gyn physician at [facility] in preparation for delivery.  Primary OB should contact maternal fetal medicine if an assistance is needed if patient/ob desires delivery at [facility].    15 min was spent face-to-face time with greater than half that time spent counseling and coordination of care.    Impression  =========    Pringle live intrauterine pregnancy.  Overall normal EFW but AC is at the 95th percentile.  Normal amniotic fluid volume.  Limited fetal anatomy appears normal.  Complete placenta previa is noted-the placenta is over the internal cervical os with a a placental lake just to the right of the placenta that is  covering the cervix. The fetal umbilical cord is over the lake but there is 2.5cm of depth between the cord and the uteroplacental interface. No  overt signs of placenta accreta.  This is not a vasa previa but as pregnancy progresses the lake may stretch and cover the internal os with fetal vessels just over the lake and  needs to be monitored.  Transvaginal cervical length was normal  today.    Recommendation  ==============    Recommend primary ob repeat GDM screen.  Pelvic rest.  Follow up ultrasound in 2-3 weeks for growth/TV scan to assess placenta for delivery timing.  If patient delivers prior to 37 weeks, recommend late  betamethasone if no contraindications.  Type and cross for delivery.  Primary ob should notify pediatricians at delivery of family history and ultrasound findings.  See above.

## 2020-03-18 ENCOUNTER — PROCEDURE VISIT (OUTPATIENT)
Dept: MATERNAL FETAL MEDICINE | Facility: CLINIC | Age: 35
End: 2020-03-18
Payer: COMMERCIAL

## 2020-03-18 VITALS
BODY MASS INDEX: 28.81 KG/M2 | SYSTOLIC BLOOD PRESSURE: 111 MMHG | DIASTOLIC BLOOD PRESSURE: 81 MMHG | WEIGHT: 148.69 LBS

## 2020-03-18 DIAGNOSIS — Z36.89 ENCOUNTER FOR ULTRASOUND TO CHECK FETAL GROWTH: ICD-10-CM

## 2020-03-18 PROCEDURE — 99214 PR OFFICE/OUTPT VISIT, EST, LEVL IV, 30-39 MIN: ICD-10-PCS | Mod: 25,S$GLB,, | Performed by: PEDIATRICS

## 2020-03-18 PROCEDURE — 99214 OFFICE O/P EST MOD 30 MIN: CPT | Mod: 25,S$GLB,, | Performed by: PEDIATRICS

## 2020-03-18 PROCEDURE — 76819 PR US, OB, FETAL BIOPHYSICAL, W/O NST: ICD-10-PCS | Mod: S$GLB,,, | Performed by: PEDIATRICS

## 2020-03-18 PROCEDURE — 76816 OB US FOLLOW-UP PER FETUS: CPT | Mod: S$GLB,,, | Performed by: PEDIATRICS

## 2020-03-18 PROCEDURE — 76819 FETAL BIOPHYS PROFIL W/O NST: CPT | Mod: S$GLB,,, | Performed by: PEDIATRICS

## 2020-03-18 PROCEDURE — 76816 PR  US,PREGNANT UTERUS,F/U,TRANSABD APP: ICD-10-PCS | Mod: S$GLB,,, | Performed by: PEDIATRICS

## 2020-03-31 ENCOUNTER — ANESTHESIA (OUTPATIENT)
Dept: OBSTETRICS AND GYNECOLOGY | Facility: HOSPITAL | Age: 35
End: 2020-03-31
Payer: COMMERCIAL

## 2020-03-31 ENCOUNTER — HOSPITAL ENCOUNTER (INPATIENT)
Facility: HOSPITAL | Age: 35
LOS: 2 days | Discharge: HOME | End: 2020-04-02
Attending: OBSTETRICS & GYNECOLOGY | Admitting: OBSTETRICS & GYNECOLOGY
Payer: COMMERCIAL

## 2020-03-31 ENCOUNTER — ANESTHESIA EVENT (OUTPATIENT)
Dept: OBSTETRICS AND GYNECOLOGY | Facility: HOSPITAL | Age: 35
End: 2020-03-31
Payer: COMMERCIAL

## 2020-03-31 DIAGNOSIS — Z86.39 HISTORY OF HYPERLIPIDEMIA: ICD-10-CM

## 2020-03-31 DIAGNOSIS — B00.2 PRIMARY HSV INFECTION OF MOUTH: ICD-10-CM

## 2020-03-31 DIAGNOSIS — O44.03 PLACENTA PREVIA IN THIRD TRIMESTER: ICD-10-CM

## 2020-03-31 DIAGNOSIS — Z34.03 ENCOUNTER FOR SUPERVISION OF NORMAL FIRST PREGNANCY IN THIRD TRIMESTER: Primary | ICD-10-CM

## 2020-03-31 DIAGNOSIS — Z78.9 PATIENT IS SCHEDULED FOR SURGICAL PROCEDURE: ICD-10-CM

## 2020-03-31 DIAGNOSIS — Z83.2 FAMILY HISTORY OF HEMOPHILIA: ICD-10-CM

## 2020-03-31 PROBLEM — Z30.09 UNWANTED FERTILITY: Status: ACTIVE | Noted: 2020-03-24

## 2020-03-31 LAB
ABO + RH BLD: NORMAL
BASOPHILS # BLD AUTO: 0.02 K/UL (ref 0–0.2)
BASOPHILS NFR BLD: 0.3 % (ref 0–1.9)
BLD GP AB SCN CELLS X3 SERPL QL: NORMAL
DIFFERENTIAL METHOD: ABNORMAL
EOSINOPHIL # BLD AUTO: 0.1 K/UL (ref 0–0.5)
EOSINOPHIL NFR BLD: 1.1 % (ref 0–8)
ERYTHROCYTE [DISTWIDTH] IN BLOOD BY AUTOMATED COUNT: 13.8 % (ref 11.5–14.5)
HCT VFR BLD AUTO: 36.7 % (ref 37–48.5)
HGB BLD-MCNC: 11.9 G/DL (ref 12–16)
IMM GRANULOCYTES # BLD AUTO: 0.05 K/UL (ref 0–0.04)
IMM GRANULOCYTES NFR BLD AUTO: 0.8 % (ref 0–0.5)
LYMPHOCYTES # BLD AUTO: 1.6 K/UL (ref 1–4.8)
LYMPHOCYTES NFR BLD: 25 % (ref 18–48)
MCH RBC QN AUTO: 29.2 PG (ref 27–31)
MCHC RBC AUTO-ENTMCNC: 32.4 G/DL (ref 32–36)
MCV RBC AUTO: 90 FL (ref 82–98)
MONOCYTES # BLD AUTO: 0.6 K/UL (ref 0.3–1)
MONOCYTES NFR BLD: 9.4 % (ref 4–15)
NEUTROPHILS # BLD AUTO: 4.1 K/UL (ref 1.8–7.7)
NEUTROPHILS NFR BLD: 63.4 % (ref 38–73)
NRBC BLD-RTO: 0 /100 WBC
PLATELET # BLD AUTO: 187 K/UL (ref 150–350)
PMV BLD AUTO: 11 FL (ref 9.2–12.9)
RBC # BLD AUTO: 4.07 M/UL (ref 4–5.4)
WBC # BLD AUTO: 6.51 K/UL (ref 3.9–12.7)

## 2020-03-31 PROCEDURE — 88307 TISSUE EXAM BY PATHOLOGIST: CPT | Mod: 26,,, | Performed by: PATHOLOGY

## 2020-03-31 PROCEDURE — 25000003 PHARM REV CODE 250: Performed by: ANESTHESIOLOGY

## 2020-03-31 PROCEDURE — 59514 PRA REAN DELIVERY ONLY: ICD-10-PCS | Mod: CRNA,,, | Performed by: NURSE ANESTHETIST, CERTIFIED REGISTERED

## 2020-03-31 PROCEDURE — 63600175 PHARM REV CODE 636 W HCPCS: Performed by: ANESTHESIOLOGY

## 2020-03-31 PROCEDURE — 88307 TISSUE EXAM BY PATHOLOGIST: CPT | Performed by: PATHOLOGY

## 2020-03-31 PROCEDURE — 86592 SYPHILIS TEST NON-TREP QUAL: CPT

## 2020-03-31 PROCEDURE — 37000008 HC ANESTHESIA 1ST 15 MINUTES: Performed by: OBSTETRICS & GYNECOLOGY

## 2020-03-31 PROCEDURE — 51702 INSERT TEMP BLADDER CATH: CPT

## 2020-03-31 PROCEDURE — 27100025 HC TUBING, SET FLUID WARMER: Performed by: NURSE ANESTHETIST, CERTIFIED REGISTERED

## 2020-03-31 PROCEDURE — 37000009 HC ANESTHESIA EA ADD 15 MINS: Performed by: OBSTETRICS & GYNECOLOGY

## 2020-03-31 PROCEDURE — 36000685 HC CESAREAN SECTION LEVEL I

## 2020-03-31 PROCEDURE — 86901 BLOOD TYPING SEROLOGIC RH(D): CPT

## 2020-03-31 PROCEDURE — 59514 PRA REAN DELIVERY ONLY: ICD-10-PCS | Mod: ANES,,, | Performed by: ANESTHESIOLOGY

## 2020-03-31 PROCEDURE — 27200688 HC TRAY, SPINAL-HYPER/ ISOBARIC: Performed by: NURSE ANESTHETIST, CERTIFIED REGISTERED

## 2020-03-31 PROCEDURE — 25000003 PHARM REV CODE 250: Performed by: OBSTETRICS & GYNECOLOGY

## 2020-03-31 PROCEDURE — S0028 INJECTION, FAMOTIDINE, 20 MG: HCPCS | Performed by: ANESTHESIOLOGY

## 2020-03-31 PROCEDURE — 36415 COLL VENOUS BLD VENIPUNCTURE: CPT

## 2020-03-31 PROCEDURE — 63600175 PHARM REV CODE 636 W HCPCS: Performed by: OBSTETRICS & GYNECOLOGY

## 2020-03-31 PROCEDURE — 11000001 HC ACUTE MED/SURG PRIVATE ROOM

## 2020-03-31 PROCEDURE — 63600175 PHARM REV CODE 636 W HCPCS: Performed by: NURSE ANESTHETIST, CERTIFIED REGISTERED

## 2020-03-31 PROCEDURE — 88307 PR  SURG PATH,LEVEL V: ICD-10-PCS | Mod: 26,,, | Performed by: PATHOLOGY

## 2020-03-31 PROCEDURE — 72100002 HC LABOR CARE, 1ST 8 HOURS

## 2020-03-31 PROCEDURE — 59514 CESAREAN DELIVERY ONLY: CPT | Mod: ANES,,, | Performed by: ANESTHESIOLOGY

## 2020-03-31 PROCEDURE — 85025 COMPLETE CBC W/AUTO DIFF WBC: CPT

## 2020-03-31 PROCEDURE — 59514 CESAREAN DELIVERY ONLY: CPT | Mod: CRNA,,, | Performed by: NURSE ANESTHETIST, CERTIFIED REGISTERED

## 2020-03-31 RX ORDER — CARBOPROST TROMETHAMINE 250 UG/ML
250 INJECTION, SOLUTION INTRAMUSCULAR
Status: DISCONTINUED | OUTPATIENT
Start: 2020-03-31 | End: 2020-04-02 | Stop reason: HOSPADM

## 2020-03-31 RX ORDER — OXYTOCIN/RINGER'S LACTATE 30/500 ML
95 PLASTIC BAG, INJECTION (ML) INTRAVENOUS ONCE
Status: DISCONTINUED | OUTPATIENT
Start: 2020-03-31 | End: 2020-04-02 | Stop reason: HOSPADM

## 2020-03-31 RX ORDER — OXYTOCIN/RINGER'S LACTATE 30/500 ML
334 PLASTIC BAG, INJECTION (ML) INTRAVENOUS ONCE
Status: DISCONTINUED | OUTPATIENT
Start: 2020-03-31 | End: 2020-04-02 | Stop reason: HOSPADM

## 2020-03-31 RX ORDER — ONDANSETRON 8 MG/1
8 TABLET, ORALLY DISINTEGRATING ORAL EVERY 8 HOURS PRN
Status: DISCONTINUED | OUTPATIENT
Start: 2020-03-31 | End: 2020-04-02 | Stop reason: HOSPADM

## 2020-03-31 RX ORDER — ACETAMINOPHEN 10 MG/ML
INJECTION, SOLUTION INTRAVENOUS
Status: DISCONTINUED | OUTPATIENT
Start: 2020-03-31 | End: 2020-03-31

## 2020-03-31 RX ORDER — AMOXICILLIN 250 MG
1 CAPSULE ORAL NIGHTLY PRN
Status: DISCONTINUED | OUTPATIENT
Start: 2020-03-31 | End: 2020-04-02 | Stop reason: HOSPADM

## 2020-03-31 RX ORDER — PHENYLEPHRINE HYDROCHLORIDE 10 MG/ML
INJECTION INTRAVENOUS
Status: DISCONTINUED | OUTPATIENT
Start: 2020-03-31 | End: 2020-03-31

## 2020-03-31 RX ORDER — MISOPROSTOL 200 UG/1
800 TABLET ORAL
Status: DISCONTINUED | OUTPATIENT
Start: 2020-03-31 | End: 2020-04-02 | Stop reason: HOSPADM

## 2020-03-31 RX ORDER — FAMOTIDINE 10 MG/ML
20 INJECTION INTRAVENOUS ONCE
Status: COMPLETED | OUTPATIENT
Start: 2020-03-31 | End: 2020-03-31

## 2020-03-31 RX ORDER — OXYTOCIN 10 [USP'U]/ML
INJECTION, SOLUTION INTRAMUSCULAR; INTRAVENOUS
Status: DISCONTINUED | OUTPATIENT
Start: 2020-03-31 | End: 2020-03-31

## 2020-03-31 RX ORDER — MUPIROCIN 20 MG/G
OINTMENT TOPICAL
Status: DISCONTINUED | OUTPATIENT
Start: 2020-03-31 | End: 2020-04-02 | Stop reason: HOSPADM

## 2020-03-31 RX ORDER — SODIUM CHLORIDE, SODIUM LACTATE, POTASSIUM CHLORIDE, CALCIUM CHLORIDE 600; 310; 30; 20 MG/100ML; MG/100ML; MG/100ML; MG/100ML
INJECTION, SOLUTION INTRAVENOUS CONTINUOUS
Status: DISCONTINUED | OUTPATIENT
Start: 2020-03-31 | End: 2020-04-02 | Stop reason: HOSPADM

## 2020-03-31 RX ORDER — CEFAZOLIN SODIUM 2 G/50ML
2 SOLUTION INTRAVENOUS
Status: COMPLETED | OUTPATIENT
Start: 2020-03-31 | End: 2020-03-31

## 2020-03-31 RX ORDER — METHYLERGONOVINE MALEATE 0.2 MG/ML
200 INJECTION INTRAVENOUS
Status: DISCONTINUED | OUTPATIENT
Start: 2020-03-31 | End: 2020-04-02 | Stop reason: HOSPADM

## 2020-03-31 RX ORDER — HYDROCODONE BITARTRATE AND ACETAMINOPHEN 10; 325 MG/1; MG/1
1 TABLET ORAL EVERY 4 HOURS PRN
Status: DISCONTINUED | OUTPATIENT
Start: 2020-03-31 | End: 2020-04-02 | Stop reason: HOSPADM

## 2020-03-31 RX ORDER — SODIUM CITRATE AND CITRIC ACID MONOHYDRATE 334; 500 MG/5ML; MG/5ML
30 SOLUTION ORAL ONCE
Status: COMPLETED | OUTPATIENT
Start: 2020-03-31 | End: 2020-03-31

## 2020-03-31 RX ORDER — BISACODYL 10 MG
10 SUPPOSITORY, RECTAL RECTAL ONCE AS NEEDED
Status: DISCONTINUED | OUTPATIENT
Start: 2020-03-31 | End: 2020-04-02 | Stop reason: HOSPADM

## 2020-03-31 RX ORDER — ONDANSETRON 2 MG/ML
4 INJECTION INTRAMUSCULAR; INTRAVENOUS EVERY 12 HOURS PRN
Status: DISCONTINUED | OUTPATIENT
Start: 2020-03-31 | End: 2020-04-02 | Stop reason: HOSPADM

## 2020-03-31 RX ORDER — SODIUM CHLORIDE, SODIUM LACTATE, POTASSIUM CHLORIDE, CALCIUM CHLORIDE 600; 310; 30; 20 MG/100ML; MG/100ML; MG/100ML; MG/100ML
INJECTION, SOLUTION INTRAVENOUS CONTINUOUS
Status: ACTIVE | OUTPATIENT
Start: 2020-03-31 | End: 2020-04-01

## 2020-03-31 RX ORDER — NALOXONE HCL 0.4 MG/ML
0.02 VIAL (ML) INJECTION
Status: DISCONTINUED | OUTPATIENT
Start: 2020-03-31 | End: 2020-04-02 | Stop reason: HOSPADM

## 2020-03-31 RX ORDER — ADHESIVE BANDAGE
30 BANDAGE TOPICAL 2 TIMES DAILY PRN
Status: DISCONTINUED | OUTPATIENT
Start: 2020-04-01 | End: 2020-04-02 | Stop reason: HOSPADM

## 2020-03-31 RX ORDER — HYDROMORPHONE HCL IN 0.9% NACL 6 MG/30 ML
PATIENT CONTROLLED ANALGESIA SYRINGE INTRAVENOUS CONTINUOUS
Status: DISCONTINUED | OUTPATIENT
Start: 2020-03-31 | End: 2020-04-02 | Stop reason: HOSPADM

## 2020-03-31 RX ORDER — ACETAMINOPHEN 500 MG
1000 TABLET ORAL ONCE
Status: COMPLETED | OUTPATIENT
Start: 2020-03-31 | End: 2020-03-31

## 2020-03-31 RX ORDER — PRENATAL WITH FERROUS FUM AND FOLIC ACID 3080; 920; 120; 400; 22; 1.84; 3; 20; 10; 1; 12; 200; 27; 25; 2 [IU]/1; [IU]/1; MG/1; [IU]/1; MG/1; MG/1; MG/1; MG/1; MG/1; MG/1; UG/1; MG/1; MG/1; MG/1; MG/1
1 TABLET ORAL DAILY
Status: DISCONTINUED | OUTPATIENT
Start: 2020-03-31 | End: 2020-04-02 | Stop reason: HOSPADM

## 2020-03-31 RX ORDER — FENTANYL CITRATE 50 UG/ML
INJECTION, SOLUTION INTRAMUSCULAR; INTRAVENOUS
Status: DISCONTINUED | OUTPATIENT
Start: 2020-03-31 | End: 2020-03-31

## 2020-03-31 RX ORDER — ACETAMINOPHEN 325 MG/1
650 TABLET ORAL EVERY 6 HOURS PRN
Status: DISCONTINUED | OUTPATIENT
Start: 2020-03-31 | End: 2020-04-02 | Stop reason: HOSPADM

## 2020-03-31 RX ORDER — SIMETHICONE 80 MG
1 TABLET,CHEWABLE ORAL EVERY 6 HOURS PRN
Status: DISCONTINUED | OUTPATIENT
Start: 2020-03-31 | End: 2020-04-02 | Stop reason: HOSPADM

## 2020-03-31 RX ORDER — DIPHENHYDRAMINE HCL 25 MG
25 CAPSULE ORAL EVERY 4 HOURS PRN
Status: DISCONTINUED | OUTPATIENT
Start: 2020-03-31 | End: 2020-04-02 | Stop reason: HOSPADM

## 2020-03-31 RX ORDER — HYDROCORTISONE 25 MG/G
CREAM TOPICAL 3 TIMES DAILY PRN
Status: DISCONTINUED | OUTPATIENT
Start: 2020-03-31 | End: 2020-04-02 | Stop reason: HOSPADM

## 2020-03-31 RX ORDER — DOCUSATE SODIUM 100 MG/1
200 CAPSULE, LIQUID FILLED ORAL 2 TIMES DAILY
Status: DISCONTINUED | OUTPATIENT
Start: 2020-03-31 | End: 2020-04-02 | Stop reason: HOSPADM

## 2020-03-31 RX ORDER — HYDROCODONE BITARTRATE AND ACETAMINOPHEN 5; 325 MG/1; MG/1
1 TABLET ORAL EVERY 4 HOURS PRN
Status: DISCONTINUED | OUTPATIENT
Start: 2020-03-31 | End: 2020-04-02 | Stop reason: HOSPADM

## 2020-03-31 RX ORDER — MUPIROCIN 20 MG/G
OINTMENT TOPICAL 2 TIMES DAILY
Status: DISCONTINUED | OUTPATIENT
Start: 2020-03-31 | End: 2020-04-02 | Stop reason: HOSPADM

## 2020-03-31 RX ADMIN — ACETAMINOPHEN 1000 MG: 500 TABLET ORAL at 05:03

## 2020-03-31 RX ADMIN — MUPIROCIN: 20 OINTMENT TOPICAL at 08:03

## 2020-03-31 RX ADMIN — PHENYLEPHRINE HYDROCHLORIDE 200 MCG: 10 INJECTION INTRAVENOUS at 10:03

## 2020-03-31 RX ADMIN — SODIUM CHLORIDE, SODIUM LACTATE, POTASSIUM CHLORIDE, AND CALCIUM CHLORIDE 1000 ML: .6; .31; .03; .02 INJECTION, SOLUTION INTRAVENOUS at 08:03

## 2020-03-31 RX ADMIN — SODIUM CHLORIDE, SODIUM LACTATE, POTASSIUM CHLORIDE, AND CALCIUM CHLORIDE: .6; .31; .03; .02 INJECTION, SOLUTION INTRAVENOUS at 10:03

## 2020-03-31 RX ADMIN — OXYTOCIN 20 UNITS: 10 INJECTION, SOLUTION INTRAMUSCULAR; INTRAVENOUS at 10:03

## 2020-03-31 RX ADMIN — ACETAMINOPHEN 1000 MG: 10 INJECTION, SOLUTION INTRAVENOUS at 11:03

## 2020-03-31 RX ADMIN — DOCUSATE SODIUM 200 MG: 100 CAPSULE, LIQUID FILLED ORAL at 08:03

## 2020-03-31 RX ADMIN — FENTANYL CITRATE 10 MCG: 50 INJECTION, SOLUTION INTRAMUSCULAR; INTRAVENOUS at 10:03

## 2020-03-31 RX ADMIN — SODIUM CHLORIDE, SODIUM LACTATE, POTASSIUM CHLORIDE, AND CALCIUM CHLORIDE: .6; .31; .03; .02 INJECTION, SOLUTION INTRAVENOUS at 01:03

## 2020-03-31 RX ADMIN — SODIUM CHLORIDE, SODIUM LACTATE, POTASSIUM CHLORIDE, AND CALCIUM CHLORIDE: .6; .31; .03; .02 INJECTION, SOLUTION INTRAVENOUS at 07:03

## 2020-03-31 RX ADMIN — PHENYLEPHRINE HYDROCHLORIDE 100 MCG: 10 INJECTION INTRAVENOUS at 10:03

## 2020-03-31 RX ADMIN — FENTANYL CITRATE 50 MCG: 50 INJECTION, SOLUTION INTRAMUSCULAR; INTRAVENOUS at 10:03

## 2020-03-31 RX ADMIN — FAMOTIDINE 20 MG: 10 INJECTION INTRAVENOUS at 08:03

## 2020-03-31 RX ADMIN — FENTANYL CITRATE 40 MCG: 50 INJECTION, SOLUTION INTRAMUSCULAR; INTRAVENOUS at 10:03

## 2020-03-31 RX ADMIN — SODIUM CITRATE AND CITRIC ACID MONOHYDRATE 30 ML: 500; 334 SOLUTION ORAL at 08:03

## 2020-03-31 RX ADMIN — CEFAZOLIN SODIUM 2 G: 2 SOLUTION INTRAVENOUS at 08:03

## 2020-03-31 RX ADMIN — Medication: at 11:03

## 2020-03-31 NOTE — OP NOTE
Delivery Note    Obstetrician: Nadya    Pre-Delivery Diagnosis:  pregnancy <37 weeks or Pregnancy complicated by: placenta previa s/p steroid course    Post-Delivery Diagnosis: s/p primary c/s    Procedure: Primary  section    Infant Wt:  pending    Apgars: 1' 9  /  5' 9     Placenta and Cord: Manual    Specimen: Placenta    Estimated Blood Loss: 500           Complications: None        Condition:  Mom and baby in good condition    Findings: normal appearing gravid uterus, tubes, ovaries, placenta previa, vertex vigorous male infant    Patient taken to OR prepped and draped in usual sterile fashion.  Pfannenstiel incision made and taken down to fascia.  Fascia excised sharply and dissected away from rectus muscles.  Peritonuem identified and entered bluntly.  Bladder flap made and bladder blade placed.  Low transverse uterine incision made with scalpel and extended by pulling and infant delivered atraumatically and handed off to waiting [facility].  Placenta delivered and uterus exteriorized and cleared of clots and debris.  Hysterotomy closed in running locking fashion in two layers with good hemostasis. Uterus returned to abdominal cavity and excess clots removed from abdomen.  Rectus muscles made hemostatic with cautery and peritoneum re-approximated with chromic suture.  Fascia closed with vicryl in running non locking fashion.  Subcutaenous tissue irrigated and made hemostatic with cautery.  Skin closed with absorbable staples.  Sterile pressure dressing applied.  Patient in stable condition. Sponge lap and needle counts correct x 2.

## 2020-03-31 NOTE — ANESTHESIA POSTPROCEDURE EVALUATION
Anesthesia Post Evaluation    Patient: Alisa Grigsby    Procedure(s) Performed: Procedure(s) (LRB):   SECTION (N/A)    Final Anesthesia Type: spinal    Patient location during evaluation: labor & delivery  Patient participation: Yes- Able to Participate  Level of consciousness: awake and alert  Post-procedure vital signs: reviewed and stable  Pain management: adequate  Airway patency: patent    PONV status at discharge: No PONV  Anesthetic complications: no      Cardiovascular status: hemodynamically stable  Respiratory status: unassisted and spontaneous ventilation  Hydration status: euvolemic  Follow-up not needed.          Vitals Value Taken Time   /60 3/31/2020 12:12 PM   Temp 36.5 °C (97.7 °F) 3/31/2020 11:04 AM   Pulse 77 3/31/2020 12:16 PM   Resp 18 3/31/2020 11:04 AM   SpO2 100 % 3/31/2020 12:16 PM   Vitals shown include unvalidated device data.      Event Time     Out of Recovery 13:00:00          Pain/Denisha Score: Pain Rating Prior to Med Admin: 0 (3/31/2020 11:30 AM)

## 2020-03-31 NOTE — PROGRESS NOTES
Spoke with anesthesia and advised that the patient is requesting more tylenol, and that  I cannot see in the MAR where she received the 1 gram of tylenol in the OR. Per anesthesia, they would chart the tylenol given in the OR, and as long as it's been at least 6 hours, she can receive a dose of 1 gram of tylenol PO.

## 2020-03-31 NOTE — ANESTHESIA PROCEDURE NOTES
Spinal    Diagnosis: IUP; placenta previa  Patient location during procedure: OR  Start time: 3/31/2020 10:01 AM  Timeout: 3/31/2020 10:00 AM  End time: 3/31/2020 10:10 AM    Staffing  Authorizing Provider: Everardo Grigsby MD  Performing Provider: Everardo Grigsby MD    Preanesthetic Checklist  Completed: patient identified, surgical consent, pre-op evaluation, timeout performed, IV checked, risks and benefits discussed and monitors and equipment checked  Spinal Block  Patient position: sitting  Prep: ChloraPrep  Patient monitoring: heart rate, continuous pulse ox and frequent blood pressure checks  Approach: midline  Location: L3-4  Injection technique: single shot  CSF Fluid: clear free-flowing CSF and blood-tinged free-flowing CSF  Needle  Needle type: pencil-tip   Needle gauge: 25 G  Needle length: 3.5 in  Additional Documentation: incremental injection, negative aspiration for heme and no paresthesia on injection  Needle localization: anatomical landmarks  Assessment  Sensory level: T4   Dermatomal levels determined by pinch or prick  Ease of block: easy  Patient's tolerance of the procedure: comfortable throughout block and no complaints

## 2020-03-31 NOTE — ANESTHESIA PREPROCEDURE EVALUATION
03/31/2020  Alisa Grigsby is a 34 y.o., female.    Anesthesia Evaluation    I have reviewed the Patient Summary Reports.    I have reviewed the Nursing Notes.      Review of Systems  Anesthesia Hx:  No problems with previous Anesthesia  Denies Family Hx of Anesthesia complications.   Denies Personal Hx of Anesthesia complications.   Social:  Non-Smoker, No Alcohol Use    Cardiovascular:   Exercise tolerance: good Denies Hypertension.  Denies MI.   Denies Dysrhythmias.   Denies Angina.    Pulmonary:  Pulmonary Normal    Renal/:  Renal/ Normal     Hepatic/GI:  Hepatic/GI Normal    OB/GYN/PEDS:  IUP; placenta previa   Neurological:  Neurology Normal    Endocrine:  Endocrine Normal        Physical Exam  General:  Well nourished    Airway/Jaw/Neck:  Airway Findings: Mouth Opening: Small, but > 3cm Tongue: Normal  Mallampati: III  TM Distance: 4 - 6 cm      Dental:  Dental Findings: In tact   Chest/Lungs:  Chest/Lungs Findings: Normal Respiratory Rate     Heart/Vascular:  Heart Findings: Rate: Normal        Mental Status:  Mental Status Findings:  Cooperative, Alert and Oriented       Wt Readings from Last 3 Encounters:   03/31/20 67.1 kg (148 lb)   03/18/20 67.4 kg (148 lb 11.2 oz)   02/26/20 65.8 kg (145 lb 1 oz)     Temp Readings from Last 3 Encounters:   03/31/20 36.7 °C (98.1 °F) (Oral)   08/14/19 37.1 °C (98.8 °F) (Oral)   05/18/18 37 °C (98.6 °F) (Oral)     BP Readings from Last 3 Encounters:   03/31/20 113/65   03/18/20 111/81   02/26/20 110/69     Pulse Readings from Last 3 Encounters:   03/31/20 93   08/14/19 87   02/08/19 86     Lab Results   Component Value Date    WBC 6.5 09/20/2019    HGB 11.8 (L) 09/20/2019    HCT 39.7 09/20/2019    MCV 94.0 09/20/2019    .0 09/20/2019           Anesthesia Plan  Type of Anesthesia, risks & benefits discussed:  Anesthesia Type:  epidural, general, spinal,  CSE  Patient's Preference:   Intra-op Monitoring Plan: standard ASA monitors  Intra-op Monitoring Plan Comments:   Post Op Pain Control Plan: multimodal analgesia, per primary service following discharge from PACU and PCA  Post Op Pain Control Plan Comments:   Induction:   IV  Beta Blocker:  Patient is not currently on a Beta-Blocker (No further documentation required).       Informed Consent: Patient understands risks and agrees with Anesthesia plan.  Questions answered. Anesthesia consent signed with patient.  ASA Score: 2     Day of Surgery Review of History & Physical: I have interviewed and examined the patient. I have reviewed the patient's H&P dated:            Ready For Surgery From Anesthesia Perspective.

## 2020-03-31 NOTE — H&P
`History and Physical  Obstetrics      SUBJECTIVE:     Alisa Grigsby is a 34 y.o.  female at 36w4d  admitted for .  Her current obstetrical history is significant for placenta previa s/p steroid course last week and family h/o of hemophilia. Pt now declines pp btl.    PTA Medications   Medication Sig    diphenhydrAMINE (BENADRYL) 50 MG capsule Take 50 mg by mouth every 6 (six) hours as needed for Itching.    penciclovir (DENAVIR) 1 % cream Apply to affected areas every 2 hours while awake, for 4 days (Patient not taking: Reported on 2020)    prenatal vit27,calcium-iron-FA (VINATE ONE) 60 mg iron-1 mg Tab Take 1 tablet by mouth once daily.    triamcinolone (KENALOG) 0.5 % ointment Apply topically 2 (two) times daily. for 10 days    valACYclovir (VALTREX) 1000 MG tablet TAKE 2 TABLETS BY MOUTH EVERY 12 (TWELVE) HOURS. (Patient not taking: Reported on 2020)       Review of patient's allergies indicates:   Allergen Reactions    Ibuprofen Swelling     Swelling of eyes    Nsaids (non-steroidal anti-inflammatory drug) Swelling        Past Medical History:   Diagnosis Date    Allergy      History reviewed. No pertinent surgical history.  Family History   Problem Relation Age of Onset    Hyperlipidemia Mother     Hyperlipidemia Father     Hypothyroidism Sister     No Known Problems Brother     No Known Problems Daughter     Stroke Paternal Grandmother     Breast cancer Neg Hx     Colon cancer Neg Hx     Ovarian cancer Neg Hx      Social History     Tobacco Use    Smoking status: Never Smoker    Smokeless tobacco: Never Used   Substance Use Topics    Alcohol use: No    Drug use: No        OBJECTIVE:     Vital Signs (Most Recent)  Temp: 98 °F (36.7 °C) (20)  Pulse: 88 (20)  Resp: 18 (20)  BP: 118/72 (20)  SpO2: 99 % (20)    Physical Exam:  General:  Normal, alert and oriented                       Abdomen: Soft gravid no FT    Uterine Size:  c/w dates   Presentations:  vertex   FHT: reviewed   Pelvis: NEFG   Cervix: declined                              Laboratory:  No results for input(s): ABORH, HEPBSAG, RUBELLAIGGSC, GBS, AFP, TOBIRJE0RE in the last 168 hours.   ASSESSMENT/PLAN:     36w4d gestation/ placenta previa s/p steroid course last week for primary c/s

## 2020-03-31 NOTE — LACTATION NOTE
This note was copied from a baby's chart.     03/31/20 1108   Coping/Psychosocial   Care Plan Reviewed With mother;father   Breastfeeding Session   Breastfeeding Left Side (min) 0 Min   Breastfeeding Right Side (min) 20 Min   Infant Positioning cradle   Effective Latch During Feeding yes   Suck/Swallow Coordination present   Signs of Milk Transfer audible swallow;infant jaw motion present   LATCH Score   Latch 2-->grasps breast, tongue down, lips flanged, rhythmic sucking   Audible Swallowing 2-->spontaneous and intermittent (24 hrs old)   Type of Nipple 2-->everted (after stimulation)   Comfort (Breast/Nipple) 2-->soft/nontender   Hold (Positioning) 2-->no assist from staff, mother able to position/hold infant   Score 10

## 2020-03-31 NOTE — NURSING
Pt arrived to L&D for scheduled . Pt reports +FM, denies LOF, VB & ctxs. History reviewed. Pt into shower with hibiclens prep

## 2020-03-31 NOTE — PLAN OF CARE
Problem: Adult Inpatient Plan of Care  Goal: Plan of Care Review  Outcome: Ongoing, Progressing     Problem: Breastfeeding  Goal: Effective Breastfeeding  Outcome: Ongoing, Progressing  Patient seen by lactation and to be followed daily. Encouraged mother to watch for infant feeding cues and to breastfeed 8 or more in 24 hours. White board updated with Spectral[employer]. Instructed to call for needs. Understanding voice.

## 2020-03-31 NOTE — LACTATION NOTE
03/31/20 1108   Maternal Assessment   Breast Density Right:;soft   Areola Right:;elastic   Nipples Right:;everted;graspable   Maternal Infant Feeding   Maternal Emotional State independent;relaxed   Infant Positioning cradle   Signs of Milk Transfer audible swallow;infant jaw motion present   Latch Assistance no   infant presently s2s and cueing. Mother easily able to latch infant to right breast in cradle hold. Infant with many sucks and a few swallows. Patient denies any pain at breast. States she BF last child for 14 months. Breastfeeding guide brought to room and basics reviewed. Encouraged to call for lactation assistance PRN. Understanding voiced. Significant other at bedside.

## 2020-03-31 NOTE — ADDENDUM NOTE
Addendum  created 03/31/20 1511 by Rogerio Lanier CRNA    Intraprocedure Meds edited, Orders acknowledged in Narrator

## 2020-03-31 NOTE — PLAN OF CARE
VSS. NADN. Respirations unlabored. Mom is tolerating her meals and is breastfeeding her . Patient managed with PCA pump and oral 1 gram of PO tylenol. Pericare performed twice since transfer to unit. Fundus firm at the umbilicus with light lochia. POC discussed with the patient and her spouse, and both verbalized understanding.

## 2020-03-31 NOTE — TRANSFER OF CARE
Anesthesia Transfer of Care Note    Patient: Alisa Grigsby    Procedure(s) Performed: Procedure(s) (LRB):   SECTION (N/A)    Patient location: Labor and Delivery    Anesthesia Type: spinal    Transport from OR: Transported from OR on room air with adequate spontaneous ventilation    Post pain: pain needs to be addressed    Post assessment: no apparent anesthetic complications and tolerated procedure well    Post vital signs: stable    Level of consciousness: awake, oriented and alert    Nausea/Vomiting: no nausea/vomiting    Complications: none    Transfer of care protocol was followed      Last vitals:   Visit Vitals  /72   Pulse 88   Temp 36.7 °C (98 °F) (Oral)   Resp 18   Ht 5' (1.524 m)   Wt 67.1 kg (148 lb)   LMP 2019   SpO2 99%   Breastfeeding? No   BMI 28.90 kg/m²

## 2020-04-01 LAB
BASOPHILS # BLD AUTO: 0.03 K/UL (ref 0–0.2)
BASOPHILS NFR BLD: 0.3 % (ref 0–1.9)
DIFFERENTIAL METHOD: ABNORMAL
EOSINOPHIL # BLD AUTO: 0.1 K/UL (ref 0–0.5)
EOSINOPHIL NFR BLD: 0.6 % (ref 0–8)
ERYTHROCYTE [DISTWIDTH] IN BLOOD BY AUTOMATED COUNT: 14 % (ref 11.5–14.5)
HCT VFR BLD AUTO: 33.3 % (ref 37–48.5)
HGB BLD-MCNC: 10.4 G/DL (ref 12–16)
IMM GRANULOCYTES # BLD AUTO: 0.06 K/UL (ref 0–0.04)
IMM GRANULOCYTES NFR BLD AUTO: 0.5 % (ref 0–0.5)
LYMPHOCYTES # BLD AUTO: 2 K/UL (ref 1–4.8)
LYMPHOCYTES NFR BLD: 17.3 % (ref 18–48)
MCH RBC QN AUTO: 28.8 PG (ref 27–31)
MCHC RBC AUTO-ENTMCNC: 31.2 G/DL (ref 32–36)
MCV RBC AUTO: 92 FL (ref 82–98)
MONOCYTES # BLD AUTO: 0.8 K/UL (ref 0.3–1)
MONOCYTES NFR BLD: 7 % (ref 4–15)
NEUTROPHILS # BLD AUTO: 8.4 K/UL (ref 1.8–7.7)
NEUTROPHILS NFR BLD: 74.3 % (ref 38–73)
NRBC BLD-RTO: 0 /100 WBC
PLATELET # BLD AUTO: 159 K/UL (ref 150–350)
PMV BLD AUTO: 10.7 FL (ref 9.2–12.9)
RBC # BLD AUTO: 3.61 M/UL (ref 4–5.4)
RPR SER QL: NORMAL
WBC # BLD AUTO: 11.36 K/UL (ref 3.9–12.7)

## 2020-04-01 PROCEDURE — 11000001 HC ACUTE MED/SURG PRIVATE ROOM

## 2020-04-01 PROCEDURE — 25000003 PHARM REV CODE 250: Performed by: OBSTETRICS & GYNECOLOGY

## 2020-04-01 PROCEDURE — 85025 COMPLETE CBC W/AUTO DIFF WBC: CPT

## 2020-04-01 RX ORDER — NAPROXEN 250 MG/1
250 TABLET ORAL 2 TIMES DAILY
Status: DISCONTINUED | OUTPATIENT
Start: 2020-04-01 | End: 2020-04-02 | Stop reason: HOSPADM

## 2020-04-01 RX ORDER — ACETAMINOPHEN 325 MG/1
650 TABLET ORAL EVERY 6 HOURS PRN
Status: DISCONTINUED | OUTPATIENT
Start: 2020-04-01 | End: 2020-04-02 | Stop reason: HOSPADM

## 2020-04-01 RX ADMIN — NAPROXEN 250 MG: 250 TABLET ORAL at 10:04

## 2020-04-01 RX ADMIN — MUPIROCIN: 20 OINTMENT TOPICAL at 09:04

## 2020-04-01 RX ADMIN — MAGNESIUM HYDROXIDE 2400 MG: 2400 SUSPENSION ORAL at 05:04

## 2020-04-01 RX ADMIN — HYDROCODONE BITARTRATE AND ACETAMINOPHEN 1 TABLET: 5; 325 TABLET ORAL at 10:04

## 2020-04-01 RX ADMIN — MUPIROCIN: 20 OINTMENT TOPICAL at 08:04

## 2020-04-01 RX ADMIN — PRENATAL VIT W/ FE FUMARATE-FA TAB 27-0.8 MG 1 TABLET: 27-0.8 TAB at 08:04

## 2020-04-01 RX ADMIN — NAPROXEN 250 MG: 250 TABLET ORAL at 09:04

## 2020-04-01 RX ADMIN — SIMETHICONE CHEW TAB 80 MG 80 MG: 80 TABLET ORAL at 08:04

## 2020-04-01 RX ADMIN — DOCUSATE SODIUM 200 MG: 100 CAPSULE, LIQUID FILLED ORAL at 08:04

## 2020-04-01 RX ADMIN — HYDROCODONE BITARTRATE AND ACETAMINOPHEN 1 TABLET: 10; 325 TABLET ORAL at 12:04

## 2020-04-01 RX ADMIN — HYDROCODONE BITARTRATE AND ACETAMINOPHEN 1 TABLET: 10; 325 TABLET ORAL at 06:04

## 2020-04-01 RX ADMIN — DOCUSATE SODIUM 200 MG: 100 CAPSULE, LIQUID FILLED ORAL at 09:04

## 2020-04-01 NOTE — PLAN OF CARE
SACHIN. Respirations unlabored. Mom had a temp of 100.1 @ 11:18. Per Dr. Farrar, continue to monitor. Patient confirmed passing gas, and she is taking a shower now. Linen are being changed by PCT. Dressing removed and pt tolerated well. Edges are closed and approximated with steri strips present prior to shower. POC discussed with the patient and options given throughout the shift, and both the patient and her spouse verbalized understanding.

## 2020-04-01 NOTE — PLAN OF CARE
Plan of care reviewed with mother. All questions and concerns addressed. Pt maintaining adequate pain control with PO pain meds. Mother and infant bonding well.  breastfeeding.  Encouraged pt to increase ambulation.  Incision care reviewed with pt.  Exam WNL     Instructed on the AAP recommendation of exclusive breastfeeding for the first 6 months of life and continued breastfeeding with the introduction of supplemental foods beyond the first year of life.  Instructed on the recommendation to delay all bottle and pacifier use until after 4 weeks of age and breastfeeding is well established.  Discussed the benefits of exclusive breastfeeding for both mother and baby.  Discussed the risks of supplementation/pacifier use on the exclusivity of breastfeeding in the first 6 months.  Pt states understanding and verbalized appropriate recall.     Infant fall prevention reviewed with mother. Mother verbalized understanding with no questions. Reviewed safety prevention facts and interventions which include the following:    *While you and your baby are in the hospital, please remember these important safety tips to keep your baby safe.     -If you are feeling weak, faint, or unsteady on your feet, DO NOT life your baby. Press the nurse call button and ask for help.  -Keep your bed in the LOWEST position (closest to the floor) at all times.  -Do NOT sleep with your baby in your bed, sofa, or chair as this may place your baby at risk of serious injury.  -When you want to sleep, first place the baby in the bassinet.  -If we find you asleep with the baby in your arms, we will move your baby to the bassinet.    *Keep your baby safe.    -Breastfeeding every 2-3 hours  -Bottle feeding every 3-4 hours  -Do NOT feed the baby in the crib with the bottle propped up. Feed the baby in your arms.  -NO heavy blankets in the crib.  -NO stuffed animals in the infants crib.  -NO balloons attached to the infant's crib.  -Keep the infant WRAPPED  with their HAT ON at all times.

## 2020-04-01 NOTE — PROGRESS NOTES
PCA and herrmann discontinued.  Pt instructed to call for assistance prior to ambulation.  Also instructed to call for pain meds. Understanding verbalized.

## 2020-04-01 NOTE — LACTATION NOTE
"   20 0902   Maternal Assessment   Breast Density Bilateral:;soft   Areola Bilateral:;elastic   Nipples Bilateral:;everted   Maternal Infant Feeding   Maternal Emotional State independent   Infant Positioning cradle   Signs of Milk Transfer audible swallow;infant jaw motion present   Pain with Feeding no   Comfort Measures Before/During Feeding infant position adjusted   Latch Assistance no   Patient reports no issues overnight with breastfeeding. FOB states infant was a little sleepy. Discussed  behavior and encouraged frequent skin-to-skin to help stimulate infant awake if sleepy when nursing. Patient denies any pain at breasts. Easily able to latch infant to right breast in cradle hold. Infant with deep latch and good sucks-audible swallows. Assisted patient with infant stimulation to keep awake. Patient reports feels a tugging.     FOB expressed concern of infant not "getting enough" because "he is so little" and asked about formula supplementation. Reviewed the risks of supplementation. Discussed the adequacy of colostrum.  Instructed on normal  feeding and sleeping patterns. Discussed potential decreased milk supply due to decreased breast stimulation from supplementation. Reinforced importance of breastfeeding 8 or more times in 24 hours and with effective latch. Discussed frequent S2S. Informed FOB that decision for supplementation is with patient and himself at this point as it is not medically indicated. Father voiced understanding. Patient denies any needs at this time. Whiteboard updated with name and number. Instructed to call for lactation assistance PRN. Understanding voiced.   "

## 2020-04-01 NOTE — PROGRESS NOTES
Subjective:     No complaints.  Ambulating, voiding, tolerating PO.  No major vaginal bleeding. Pain is controlled. Passing flatus.    Review of Systems  Nine system ROS negative    Objective:   Vitals:  Temp: 98.5 °F (36.9 °C) (20)  Pulse: 91 (20)  Resp: 19 (20)  BP: (!) 103/58 (20)  SpO2: (!) 94 % (20)  Temp:  [96.8 °F (36 °C)-99 °F (37.2 °C)]   Pulse:  []   Resp:  [18-19]   BP: (102-128)/(57-75)   SpO2:  [89 %-100 %]   General: no acute distress, alert and oriented to person, place and time  Breasts: nontender, nonengorged  Abdomen: non-distended, appropriately tender to palpation, uterus firm and below the umbilicus, no rebound/guarding  Incision: bandage still in place  Pelvic: deferred, reported minimal uterine bleeding  Extremities: calves non-tender to palpation, no calf edema, erythema or palpable cords    Recent Labs   Lab 20  0500   WBC 11.36   HGB 10.4*   HCT 33.3*        Assessment:     Postoperative Day #1 s/p scheduled primary , , for placenta previa, hemodynamcially stable doing well    Plan:     Routine postpartum care  Advance diet, ambulate, change to po pain meds, HLIV, d/c herrmann

## 2020-04-01 NOTE — PROGRESS NOTES
Spoke with Dr. Farrar and notified her that the patient received her dose of naproxen @ 10:44. 11:18 temp check 100.1 F. Patient reports feeling warm. Will remove dressing and check incision. No other complaints reported at this time. Per Dr. Farrar, continue to monitor patient.

## 2020-04-02 VITALS
TEMPERATURE: 98 F | SYSTOLIC BLOOD PRESSURE: 114 MMHG | HEIGHT: 60 IN | BODY MASS INDEX: 29.06 KG/M2 | WEIGHT: 148 LBS | HEART RATE: 99 BPM | OXYGEN SATURATION: 95 % | RESPIRATION RATE: 18 BRPM | DIASTOLIC BLOOD PRESSURE: 59 MMHG

## 2020-04-02 LAB
FINAL PATHOLOGIC DIAGNOSIS: NORMAL
GROSS: NORMAL

## 2020-04-02 PROCEDURE — 25000003 PHARM REV CODE 250: Performed by: OBSTETRICS & GYNECOLOGY

## 2020-04-02 RX ORDER — NAPROXEN 250 MG/1
375 TABLET ORAL 2 TIMES DAILY
Qty: 90 TABLET | Refills: 1 | Status: SHIPPED | OUTPATIENT
Start: 2020-04-02 | End: 2023-01-30

## 2020-04-02 RX ORDER — HYDROCODONE BITARTRATE AND ACETAMINOPHEN 5; 325 MG/1; MG/1
1 TABLET ORAL EVERY 4 HOURS PRN
Qty: 30 TABLET | Refills: 0 | Status: SHIPPED | OUTPATIENT
Start: 2020-04-02 | End: 2020-08-13

## 2020-04-02 RX ADMIN — PRENATAL VIT W/ FE FUMARATE-FA TAB 27-0.8 MG 1 TABLET: 27-0.8 TAB at 09:04

## 2020-04-02 RX ADMIN — DOCUSATE SODIUM 200 MG: 100 CAPSULE, LIQUID FILLED ORAL at 09:04

## 2020-04-02 RX ADMIN — MUPIROCIN: 20 OINTMENT TOPICAL at 09:04

## 2020-04-02 RX ADMIN — NAPROXEN 250 MG: 250 TABLET ORAL at 09:04

## 2020-04-02 NOTE — DISCHARGE SUMMARY
34 y.o.   OB History        2    Para   2    Term   1       1    AB   0    Living   2       SAB   0    TAB   0    Ectopic   0    Multiple   0    Live Births   2               admitted for Delivery. See procedure note. Postpartum course was unremarkable.     Admit date 3/31/2020  5:44 AM  D/c date 2020    Disposition: Home  Activity 6 weeks pelvic rest  Diet: normal  Discharge followup 1 week, if . Follow up in 6 weeks if vaginal delivery  Meds listed separately

## 2020-04-02 NOTE — PLAN OF CARE
Problem: Adult Inpatient Plan of Care  Goal: Plan of Care Review  Outcome: Ongoing, Progressing     POC discussed for self home care with patient and .

## 2020-04-02 NOTE — DISCHARGE INSTRUCTIONS
 Breastfeeding Discharge Instructions      AAP recommendation of exclusive breastfeeding for the first 6 months of life and continued breastfeeding with the introduction of supplemental foods beyond the first year of life and recommends to delay all bottle and pacifier use until after 4 weeks of age and breastfeeding is well established.  Discussed the benefits of exclusive breastfeeding for both mother and baby.  Discussed the risks of supplementation/pacifier use on the exclusivity of breastfeeding in the first 6 months. Feed the baby at the earliest sign of hunger or comfort  o Hands to mouth, sucking motions  o Rooting or searching for something to suck on  o Dont wait for crying - it is a not a late sign of hunger; it is a sign of distress     The feedings may be 8-12 times per 24hrs and will not follow a schedule   Alternate the breast you start the feeding with, or start with the breast that feels the fullest   Switch breasts when the baby takes himself off the breast or falls asleep   Keep offering breasts until the baby looks full, no longer gives hunger signs, and stays asleep when placed on his back in the crib   If the baby is sleepy and wont wake for a feeding, put the baby skin-to-skin dressed in a diaper against the mothers bare chest   Sleep near your baby   The baby should be positioned and latched on to the breast correctly  o Chest-to-chest, chin in the breast  o Babys lips are flipped outward  o Babys mouth is stretched open wide like a shout  o Babys sucking should feel like tugging to the mother  - The baby should be drinking at the breast:  o You should hear swallowing or gulping throughout the feeding  o You should see milk on the babys lips when he comes off the breast  o Your breasts should be softer when the baby is finished feeding  o The baby should look relaxed at the end of feedings  o After the 4th day and your milk is in:  o The babys poop should turn bright  yellow and be loose, watery, and seedy  o The baby should have at least 3-4 poops the size of the palm of your hand per day  o The baby should have at least 6-8 wet diapers per day  o The urine should be light yellow in color  You should drink when you are thirsty and eat a healthy diet when you are    hungry.     Take naps to get the rest you need.   Take medications and/or drink alcohol only with permission of your obstetrician    or the babys pediatrician.  You can also call the [facility],   ([phone]), Monday-Friday, 8am-5pm Central time, to get the most   up-to-date evidence-based information on the use of medications during   pregnancy and breastfeeding.      The baby should be examined by a pediatrician at 3-5 days of age; unless ordered sooner by the pediatrician.  Once your milk comes in, the baby should be back to birth weight no later than 10-14 days of age.    Primary Engorgement    If the milk is flowing, use wet or dry heat applied to the breasts for approximately 10min prior to each feeding as a comfort measure to facilitate the milk ejection reflex    Follow heat treatment with breast massage to soften hard/lumpy areas of the breast    Use unrestricted, frequent, effective feedings.      Wake baby to feed if necessary    Avoid pacifier and bottle feedings    Hand express or pump breasts to the point of comfort prn    Use cold treatments in the form of ice packs/gel packs/ frozen vegetables wrapped in a soft thin cloth and applied to the breasts for approximately 20min after each feeding until engorgement is resolved    Wear comfortable, supportive bra    Take pain medicine prn    Use anti-inflammatory medications if prescribed by physician    Other:    [place] Pumping Instructions :    Preparation and Hygiene:    1. Shower daily.  2. Wear a clean bra each day and wash daily in warm soapy water.  3. Change wet or moist breast pads frequently.  Moist pads can promote growth of  germs.  4. Actively wash your hands, paying close attention to the area around and under your fingernails, thoroughly with soap and water for 15 seconds before pumping or handling your milk.  Re-wash your hands if you touch anything (scratching your nose, answering the phone, etc) while pumping or handling your milk.   5. Before pumping your breasts, assemble the pump collection kit and have ready the sterile container and labels.  Place these items on a clean surface next to the breastpump.  6. Each time after you have finished pumping, take apart all of the parts of the breastpump collection kit and place them in a separate cleaning container (do not place them in the sink).  Be sure to remove the yellow valve from the breastshield and separate the white membrane from the yellow valve.  Rinse all of these parts with cool water.  Then use a new sponge and/or bottle brush and dishwashing detergent to clean the parts.  Rinse off the soapy water with cool water and air dry on a clean towel covered with a clean cloth.  All parts may also be washed after each use in the top rack of a .  7. Once each day, sterilize all of the parts of the breastpump collection kit.  This can be done by boiling the kit parts for 10 minutes or by using a Quick Clean Micro-Steam Bag made by [employer]  8. If condensation appears in the tubing, continue to run the pump with the tubing attached for 1-2 minutes or until the tubing is dry.   9. Notify your babys nurse or doctor if you become ill or need to take any medication, even over-the-counter medicines.        Collection and Storage of Expressed Breastmilk:         1. Pump your breasts at least 8-10 times every 24 hours.  Double pump (both breasts at  the same time) for at least 15-20 minutes using the most suction that is comfortable.    2. Write the date and time of pumping and the name of any medications you are takingon the babys pre-printed hospital identification  label.   3.    Do not touch the inside of the storage containers or lids.      4.        Tightly screw the lid onto the container and place immediately into the refrigerator for daily use.  Bottle may remain at room temperature if the next feeding is within 4 hours.  5.    Expressed breastmilk should be refrigerated or frozen within 4 hours of pumping.  6.        Do not store expressed breastmilk on the door of your refrigerator or freeze             where the temperature is warmer.   7.        Refrigerated milk may be stored in for up to 7 days.  At this point it can be              moved to the freezer for 6 -12 months.  8.        Thaw frozen breast milk in overnight in the refrigerator.  Once milk is thawed it              must be used within 24 hours.  9.        Refrigerated breast milk needs to be warmed to room temperature.  Warm by leaving unrefrigerated until it reached room temperature, or place sealed bottle into a cup of warm (not boiling) water or use a bottle warmer.               Never warm breast milk in a microwave or boiling water.    For any questions or concerns call:  Lactation Department at [phone]

## 2020-04-02 NOTE — LACTATION NOTE
04/02/20 0910   Maternal Assessment   Breast Density Bilateral:;filling   Areola Bilateral:;elastic   Nipples Bilateral:;everted   Right Nipple Symptoms tender   patient reports breastfeeding well overnight. Infant cluster fed last night. States breast feel heavier and feels like milk is in - hears more swallowing from infant. C/o some nipple tenderness on right and is using lanolin cream at bedside PRN. Denies any issues with latch. Denies any breakdown or bleeding Encouraged to continue to watch for infant feeding cues and bring infant to breast when displayed - at least 8 or more in 24 hours. Understanding voiced. Whiteboard updated with name and number and encouraged to call for lactation assistance PRN.

## 2020-04-02 NOTE — PROGRESS NOTES
34 y.o.   Pt is deciding to go home today due to COVID concerns in hospital. She has met all criteria.

## 2020-04-02 NOTE — PROGRESS NOTES
Postop day 1  Patient doing well, no complaints. Ambulating. Lochia minimal,  denies n/v, denies h/a, vision changes, upper abd pain, chest pain, sob. Pain is controlled. Tolerating diet, PASSING flatus    Patient Active Problem List   Diagnosis    Encounter for supervision of normal first pregnancy in third trimester    History of hyperlipidemia    Primary HSV infection of mouth    Placenta previa in third trimester    Patient is scheduled for surgical procedure    Family history of hemophilia       Temp:  [97.8 °F (36.6 °C)-100.1 °F (37.8 °C)] 99 °F (37.2 °C)  Pulse:  [] 110  Resp:  [16-20] 18  SpO2:  [93 %-95 %] 93 %  BP: ()/(55-63) 104/63  Gen: Alert, orientated  CV: Regular rate  Normal resp effort  Abd: soft nondistended fundus firm and appropriately tender  Incision: clean, dry, intact; No erythema  Ext: no significant edema; nontender calves    Recent Labs   Lab 20  0600 20  0500   WBC 6.51 11.36   HGB 11.9* 10.4*   HCT 36.7* 33.3*    159   GROUPTRH O POS  --        A&P  34 y.o.  s/p c/d  Post op doing well.  Routine post op care.  She and her partner are still considering circ.

## 2020-04-04 ENCOUNTER — TELEPHONE (OUTPATIENT)
Dept: OBSTETRICS AND GYNECOLOGY | Facility: HOSPITAL | Age: 35
End: 2020-04-04

## 2020-04-04 NOTE — TELEPHONE ENCOUNTER
"Lactation Telephone Follow up:  Spoke with pt.  Reports breastfeeding well 8 - 10 times in 24 hours, milk is in and breasts soften with feedings.  Reports 12 wets and 8 stools in last 24 hours, stools are starting to turn yellow.  Saw ped on Friday and weight was same as dc 5# 5oz.  Has next appt in 2weeks.  Denies any c/o or concerns.  Hotline # given for prn use.  States "understand'.  "

## 2020-04-21 ENCOUNTER — PATIENT MESSAGE (OUTPATIENT)
Dept: ADMINISTRATIVE | Facility: OTHER | Age: 35
End: 2020-04-21

## 2020-08-13 ENCOUNTER — LAB VISIT (OUTPATIENT)
Dept: LAB | Facility: HOSPITAL | Age: 35
End: 2020-08-13
Attending: INTERNAL MEDICINE
Payer: COMMERCIAL

## 2020-08-13 ENCOUNTER — OFFICE VISIT (OUTPATIENT)
Dept: FAMILY MEDICINE | Facility: CLINIC | Age: 35
End: 2020-08-13
Payer: COMMERCIAL

## 2020-08-13 VITALS
OXYGEN SATURATION: 97 % | SYSTOLIC BLOOD PRESSURE: 104 MMHG | HEART RATE: 87 BPM | BODY MASS INDEX: 24.71 KG/M2 | HEIGHT: 60 IN | DIASTOLIC BLOOD PRESSURE: 72 MMHG | WEIGHT: 125.88 LBS | TEMPERATURE: 99 F

## 2020-08-13 DIAGNOSIS — Z00.00 ANNUAL PHYSICAL EXAM: Primary | ICD-10-CM

## 2020-08-13 DIAGNOSIS — D64.9 NORMOCYTIC ANEMIA: ICD-10-CM

## 2020-08-13 DIAGNOSIS — Z00.00 ANNUAL PHYSICAL EXAM: ICD-10-CM

## 2020-08-13 DIAGNOSIS — K64.9 HEMORRHOIDS, UNSPECIFIED HEMORRHOID TYPE: ICD-10-CM

## 2020-08-13 LAB
ALBUMIN SERPL BCP-MCNC: 4.2 G/DL (ref 3.5–5.2)
ALP SERPL-CCNC: 87 U/L (ref 55–135)
ALT SERPL W/O P-5'-P-CCNC: 20 U/L (ref 10–44)
ANION GAP SERPL CALC-SCNC: 7 MMOL/L (ref 8–16)
AST SERPL-CCNC: 20 U/L (ref 10–40)
BASOPHILS # BLD AUTO: 0.05 K/UL (ref 0–0.2)
BASOPHILS NFR BLD: 0.9 % (ref 0–1.9)
BILIRUB SERPL-MCNC: 0.3 MG/DL (ref 0.1–1)
BUN SERPL-MCNC: 15 MG/DL (ref 6–20)
CALCIUM SERPL-MCNC: 8.9 MG/DL (ref 8.7–10.5)
CHLORIDE SERPL-SCNC: 104 MMOL/L (ref 95–110)
CHOLEST SERPL-MCNC: 216 MG/DL (ref 120–199)
CHOLEST/HDLC SERPL: 3.7 {RATIO} (ref 2–5)
CO2 SERPL-SCNC: 31 MMOL/L (ref 23–29)
CREAT SERPL-MCNC: 0.8 MG/DL (ref 0.5–1.4)
DIFFERENTIAL METHOD: ABNORMAL
EOSINOPHIL # BLD AUTO: 0.3 K/UL (ref 0–0.5)
EOSINOPHIL NFR BLD: 4.8 % (ref 0–8)
ERYTHROCYTE [DISTWIDTH] IN BLOOD BY AUTOMATED COUNT: 13.1 % (ref 11.5–14.5)
EST. GFR  (AFRICAN AMERICAN): >60 ML/MIN/1.73 M^2
EST. GFR  (NON AFRICAN AMERICAN): >60 ML/MIN/1.73 M^2
FERRITIN SERPL-MCNC: 48 NG/ML (ref 20–300)
GLUCOSE SERPL-MCNC: 111 MG/DL (ref 70–110)
HCT VFR BLD AUTO: 40.7 % (ref 37–48.5)
HDLC SERPL-MCNC: 59 MG/DL (ref 40–75)
HDLC SERPL: 27.3 % (ref 20–50)
HGB BLD-MCNC: 12.6 G/DL (ref 12–16)
IMM GRANULOCYTES # BLD AUTO: 0.01 K/UL (ref 0–0.04)
IMM GRANULOCYTES NFR BLD AUTO: 0.2 % (ref 0–0.5)
IRON SERPL-MCNC: 77 UG/DL (ref 30–160)
LDLC SERPL CALC-MCNC: 128 MG/DL (ref 63–159)
LYMPHOCYTES # BLD AUTO: 2.1 K/UL (ref 1–4.8)
LYMPHOCYTES NFR BLD: 38.6 % (ref 18–48)
MCH RBC QN AUTO: 28.2 PG (ref 27–31)
MCHC RBC AUTO-ENTMCNC: 31 G/DL (ref 32–36)
MCV RBC AUTO: 91 FL (ref 82–98)
MONOCYTES # BLD AUTO: 0.5 K/UL (ref 0.3–1)
MONOCYTES NFR BLD: 9.8 % (ref 4–15)
NEUTROPHILS # BLD AUTO: 2.5 K/UL (ref 1.8–7.7)
NEUTROPHILS NFR BLD: 45.7 % (ref 38–73)
NONHDLC SERPL-MCNC: 157 MG/DL
NRBC BLD-RTO: 0 /100 WBC
PLATELET # BLD AUTO: 281 K/UL (ref 150–350)
PMV BLD AUTO: 10.1 FL (ref 9.2–12.9)
POTASSIUM SERPL-SCNC: 4.3 MMOL/L (ref 3.5–5.1)
PROT SERPL-MCNC: 7.8 G/DL (ref 6–8.4)
RBC # BLD AUTO: 4.47 M/UL (ref 4–5.4)
SATURATED IRON: 19 % (ref 20–50)
SODIUM SERPL-SCNC: 142 MMOL/L (ref 136–145)
TOTAL IRON BINDING CAPACITY: 404 UG/DL (ref 250–450)
TRANSFERRIN SERPL-MCNC: 273 MG/DL (ref 200–375)
TRIGL SERPL-MCNC: 145 MG/DL (ref 30–150)
TSH SERPL DL<=0.005 MIU/L-ACNC: 1.83 UIU/ML (ref 0.4–4)
WBC # BLD AUTO: 5.41 K/UL (ref 3.9–12.7)

## 2020-08-13 PROCEDURE — 99999 PR PBB SHADOW E&M-EST. PATIENT-LVL III: ICD-10-PCS | Mod: PBBFAC,,, | Performed by: INTERNAL MEDICINE

## 2020-08-13 PROCEDURE — 84443 ASSAY THYROID STIM HORMONE: CPT

## 2020-08-13 PROCEDURE — 80061 LIPID PANEL: CPT

## 2020-08-13 PROCEDURE — 82746 ASSAY OF FOLIC ACID SERUM: CPT

## 2020-08-13 PROCEDURE — 83036 HEMOGLOBIN GLYCOSYLATED A1C: CPT

## 2020-08-13 PROCEDURE — 82607 VITAMIN B-12: CPT

## 2020-08-13 PROCEDURE — 99999 PR PBB SHADOW E&M-EST. PATIENT-LVL III: CPT | Mod: PBBFAC,,, | Performed by: INTERNAL MEDICINE

## 2020-08-13 PROCEDURE — 80053 COMPREHEN METABOLIC PANEL: CPT

## 2020-08-13 PROCEDURE — 83540 ASSAY OF IRON: CPT

## 2020-08-13 PROCEDURE — 82728 ASSAY OF FERRITIN: CPT

## 2020-08-13 PROCEDURE — 3008F BODY MASS INDEX DOCD: CPT | Mod: CPTII,S$GLB,, | Performed by: INTERNAL MEDICINE

## 2020-08-13 PROCEDURE — 99214 PR OFFICE/OUTPT VISIT, EST, LEVL IV, 30-39 MIN: ICD-10-PCS | Mod: S$GLB,,, | Performed by: INTERNAL MEDICINE

## 2020-08-13 PROCEDURE — 85025 COMPLETE CBC W/AUTO DIFF WBC: CPT

## 2020-08-13 PROCEDURE — 99214 OFFICE O/P EST MOD 30 MIN: CPT | Mod: S$GLB,,, | Performed by: INTERNAL MEDICINE

## 2020-08-13 PROCEDURE — 3008F PR BODY MASS INDEX (BMI) DOCUMENTED: ICD-10-PCS | Mod: CPTII,S$GLB,, | Performed by: INTERNAL MEDICINE

## 2020-08-13 RX ORDER — POLYETHYLENE GLYCOL 3350 17 G/17G
POWDER, FOR SOLUTION ORAL
COMMUNITY
End: 2023-01-30

## 2020-08-13 NOTE — PROGRESS NOTES
SUBJECTIVE     Chief Complaint   Patient presents with    Annual Exam       HPI  Alisa Grigsby is a 35 y.o. female with multiple medical diagnoses as listed in the medical history and problem list that presents for annual exam. Pt has been doing well since her last visit. She has a good appetite and eats well. She does not exercise. She sleeps for ~6-7 hours nightly at 3 hour intervals. Pt does take OTC supplements, which is a MVI. She does have any current stressors, but watches TV to relax. Pt is UTD on age appropriate [place] screening.    PAST MEDICAL HISTORY:  Past Medical History:   Diagnosis Date    Allergy        PAST SURGICAL HISTORY:  Past Surgical History:   Procedure Laterality Date     SECTION N/A 3/31/2020    Procedure:  SECTION;  Surgeon: Angelica Covington MD;  Location: [facility] L&D OR;  Service: OB/GYN;  Laterality: N/A;       SOCIAL HISTORY:  Social History     Socioeconomic History    Marital status:      Spouse name: Not on file    Number of children: Not on file    Years of education: Not on file    Highest education level: Not on file   Occupational History    Not on file   Social Needs    Financial resource strain: Not hard at all    Food insecurity     Worry: Never true     Inability: Never true    Transportation needs     Medical: No     Non-medical: No   Tobacco Use    Smoking status: Never Smoker    Smokeless tobacco: Never Used   Substance and Sexual Activity    Alcohol use: No     Frequency: Never     Binge frequency: Never    Drug use: No    Sexual activity: Yes     Partners: Male     Birth control/protection: None   Lifestyle    Physical activity     Days per week: 0 days     Minutes per session: Not on file    Stress: Only a little   Relationships    Social connections     Talks on phone: Twice a week     Gets together: More than three times a week     Attends [employer] witness service: Not on file     Active member of club or organization: No     Attends  meetings of clubs or organizations: Never     Relationship status:    Other Topics Concern    Not on file   Social History Narrative    Not on file       FAMILY HISTORY:  Family History   Problem Relation Age of Onset    Hyperlipidemia Mother     Hyperlipidemia Father     Hypothyroidism Sister     No Known Problems Brother     No Known Problems Daughter     Stroke Paternal Grandmother     Breast cancer Neg Hx     Colon cancer Neg Hx     Ovarian cancer Neg Hx        ALLERGIES AND MEDICATIONS: updated and reviewed.  Review of patient's allergies indicates:   Allergen Reactions    Ibuprofen Swelling     Swollen eyelids    Nsaids (non-steroidal anti-inflammatory drug) Swelling     Current Outpatient Medications   Medication Sig Dispense Refill    polyethylene glycol (GLYCOLAX) 17 gram/dose powder       prenatal vit27,calcium-iron-FA (VINATE ONE) 60 mg iron-1 mg Tab Take 1 tablet by mouth once daily. 30 tablet 12    triamcinolone (KENALOG) 0.5 % ointment Apply topically 2 (two) times daily. for 10 days 60 g 2    hydrocortisone-pramoxine (PROCTOFOAM-HS) rectal foam Place 1 applicator rectally 2 (two) times daily. 10 g 5    naproxen (NAPROSYN) 250 MG tablet Take 1.5 tablets (375 mg total) by mouth 2 (two) times daily. (Patient not taking: Reported on 8/13/2020) 90 tablet 1    penciclovir (DENAVIR) 1 % cream Apply to affected areas every 2 hours while awake, for 4 days (Patient not taking: Reported on 2/26/2020) 5 g 2     No current facility-administered medications for this visit.        ROS  Review of Systems   Constitutional: Negative for activity change and unexpected weight change.   HENT: Negative for hearing loss, rhinorrhea and trouble swallowing.    Eyes: Negative for discharge and visual disturbance.   Respiratory: Negative for chest tightness and wheezing.    Cardiovascular: Negative for chest pain and palpitations.   Gastrointestinal: Positive for blood in stool (2/2 hemorrhoids).  Negative for constipation, diarrhea and vomiting.   Endocrine: Negative for polydipsia and polyuria.   Genitourinary: Negative for difficulty urinating, dysuria, hematuria and menstrual problem.   Musculoskeletal: Negative for arthralgias, joint swelling and neck pain.   Skin: Negative for rash and wound.   Neurological: Negative for weakness and headaches.   Psychiatric/Behavioral: Negative for confusion and dysphoric mood.         OBJECTIVE     Physical Exam  Vitals:    08/13/20 1429   BP: 104/72   Pulse: 87   Temp: 98.8 °F (37.1 °C)    Body mass index is 24.58 kg/m².  Weight: 57.1 kg (125 lb 14.1 oz)   Height: 5' (152.4 cm)     Physical Exam  Constitutional:       General: She is not in acute distress.     Appearance: She is well-developed.   HENT:      Head: Normocephalic and atraumatic.      Right Ear: External ear normal.      Left Ear: External ear normal.      Nose: Nose normal.   Eyes:      General: No scleral icterus.        Right eye: No discharge.         Left eye: No discharge.      Conjunctiva/sclera: Conjunctivae normal.   Neck:      Musculoskeletal: Normal range of motion and neck supple.      Vascular: No JVD.      Trachea: No tracheal deviation.   Cardiovascular:      Rate and Rhythm: Normal rate and regular rhythm.      Heart sounds: No murmur. No friction rub. No gallop.    Pulmonary:      Effort: Pulmonary effort is normal. No respiratory distress.      Breath sounds: Normal breath sounds. No wheezing.   Abdominal:      General: Bowel sounds are normal. There is no distension.      Palpations: Abdomen is soft. There is no mass.      Tenderness: There is no abdominal tenderness. There is no guarding or rebound.   Musculoskeletal: Normal range of motion.         General: No tenderness or deformity.   Skin:     General: Skin is warm and dry.      Findings: No erythema or rash.   Neurological:      Mental Status: She is alert and oriented to person, place, and time.      Motor: No abnormal muscle  tone.      Coordination: Coordination normal.   Psychiatric:         Behavior: Behavior normal.         Thought Content: Thought content normal.         Judgment: Judgment normal.           Health Maintenance       Date Due Completion Date    Pap Smear 09/20/2020 9/20/2019    Influenza Vaccine (1) 09/01/2020 10/5/2019    TETANUS VACCINE 10/20/2025 10/20/2015            ASSESSMENT     35 y.o. female with     1. Annual physical exam    2. Normocytic anemia    3. Hemorrhoids, unspecified hemorrhoid type        PLAN:     1. Annual physical exam  - Counseled on age appropriate medical preventative services, including age appropriate cancer screenings, over all nutritional health, need for a consistent exercise regimen and an over all push towards maintaining a vigorous and active lifestyle.  Counseled on age appropriate vaccines and discussed upcoming health care needs based on age/gender.  Spent time with patient counseling on need for a good patient/doctor relationship moving forward.  Discussed use of common OTC medications and supplements.  Discussed common dietary aids and use of caffeine and the need for good sleep hygiene and stress management.  - CBC auto differential; Future  - Comprehensive metabolic panel; Future  - Hemoglobin A1C; Future  - TSH; Future  - Lipid Panel; Future    2. Normocytic anemia  - Iron and TIBC; Future  - Ferritin; Future  - Vitamin B12; Future  - Folate; Future    3. Hemorrhoids, unspecified hemorrhoid type  - Discussed importance of increasing fiber and water intake and walking; pt to also start SITZ baths  - hydrocortisone-pramoxine (PROCTOFOAM-HS) rectal foam; Place 1 applicator rectally 2 (two) times daily.  Dispense: 10 g; Refill: 5        RTC in 6 months     Nguyen Baird MD  08/13/2020 2:17 PM        No follow-ups on file.

## 2020-08-14 ENCOUNTER — TELEPHONE (OUTPATIENT)
Dept: FAMILY MEDICINE | Facility: CLINIC | Age: 35
End: 2020-08-14

## 2020-08-14 DIAGNOSIS — K64.9 HEMORRHOIDS, UNSPECIFIED HEMORRHOID TYPE: Primary | ICD-10-CM

## 2020-08-14 LAB
ESTIMATED AVG GLUCOSE: 108 MG/DL (ref 68–131)
FOLATE SERPL-MCNC: >40 NG/ML (ref 4–24)
HBA1C MFR BLD HPLC: 5.4 % (ref 4–5.6)
VIT B12 SERPL-MCNC: 810 PG/ML (ref 210–950)

## 2020-08-14 RX ORDER — HYDROCORTISONE ACETATE, PRAMOXINE HCL 2.5; 1 G/100G; G/100G
CREAM TOPICAL 3 TIMES DAILY
Qty: 28.4 G | Refills: 3 | Status: SHIPPED | OUTPATIENT
Start: 2020-08-14 | End: 2023-01-30

## 2020-08-14 NOTE — TELEPHONE ENCOUNTER
Return call to Pt, and she states that her [employer] doesn't cover the Proctofoam. That it's almost $200. They will cover Hydrocortisone 2.5%/Pramoxine 1% cream is covered. Please advise.

## 2020-08-14 NOTE — TELEPHONE ENCOUNTER
----- Message from Lillian Fulton sent at 8/14/2020 11:40 AM CDT -----  Type: Patient Call Back       What is the request in detail:  pt calling to speak to a nurse regarding pt medication not covered by insurance .     Can the clinic reply by [facility]? No       Would the patient rather a call back or a response via [facility]? Call back       Best call back number:   [facility] - [place], [place] - [address]  [address] [facility] [address]  Phone: [phone] Fax: [phone]

## 2021-04-16 ENCOUNTER — PATIENT MESSAGE (OUTPATIENT)
Dept: RESEARCH | Facility: HOSPITAL | Age: 36
End: 2021-04-16

## 2021-10-04 ENCOUNTER — PATIENT MESSAGE (OUTPATIENT)
Dept: ADMINISTRATIVE | Facility: HOSPITAL | Age: 36
End: 2021-10-04

## 2021-10-25 ENCOUNTER — LAB VISIT (OUTPATIENT)
Dept: LAB | Facility: HOSPITAL | Age: 36
End: 2021-10-25
Attending: INTERNAL MEDICINE
Payer: COMMERCIAL

## 2021-10-25 DIAGNOSIS — Z00.00 ANNUAL PHYSICAL EXAM: ICD-10-CM

## 2021-10-25 LAB
ALBUMIN SERPL BCP-MCNC: 4.2 G/DL (ref 3.5–5.2)
ALP SERPL-CCNC: 63 U/L (ref 55–135)
ALT SERPL W/O P-5'-P-CCNC: 17 U/L (ref 10–44)
ANION GAP SERPL CALC-SCNC: 9 MMOL/L (ref 8–16)
AST SERPL-CCNC: 20 U/L (ref 10–40)
BASOPHILS # BLD AUTO: 0.04 K/UL (ref 0–0.2)
BASOPHILS NFR BLD: 0.8 % (ref 0–1.9)
BILIRUB SERPL-MCNC: 0.4 MG/DL (ref 0.1–1)
BUN SERPL-MCNC: 12 MG/DL (ref 6–20)
CALCIUM SERPL-MCNC: 9.6 MG/DL (ref 8.7–10.5)
CHLORIDE SERPL-SCNC: 104 MMOL/L (ref 95–110)
CHOLEST SERPL-MCNC: 217 MG/DL (ref 120–199)
CHOLEST/HDLC SERPL: 4.3 {RATIO} (ref 2–5)
CO2 SERPL-SCNC: 27 MMOL/L (ref 23–29)
CREAT SERPL-MCNC: 0.7 MG/DL (ref 0.5–1.4)
DIFFERENTIAL METHOD: ABNORMAL
EOSINOPHIL # BLD AUTO: 0.2 K/UL (ref 0–0.5)
EOSINOPHIL NFR BLD: 4.5 % (ref 0–8)
ERYTHROCYTE [DISTWIDTH] IN BLOOD BY AUTOMATED COUNT: 13.1 % (ref 11.5–14.5)
EST. GFR  (AFRICAN AMERICAN): >60 ML/MIN/1.73 M^2
EST. GFR  (NON AFRICAN AMERICAN): >60 ML/MIN/1.73 M^2
ESTIMATED AVG GLUCOSE: 108 MG/DL (ref 68–131)
GLUCOSE SERPL-MCNC: 91 MG/DL (ref 70–110)
HBA1C MFR BLD: 5.4 % (ref 4–5.6)
HCT VFR BLD AUTO: 40 % (ref 37–48.5)
HDLC SERPL-MCNC: 50 MG/DL (ref 40–75)
HDLC SERPL: 23 % (ref 20–50)
HGB BLD-MCNC: 12.5 G/DL (ref 12–16)
IMM GRANULOCYTES # BLD AUTO: 0.01 K/UL (ref 0–0.04)
IMM GRANULOCYTES NFR BLD AUTO: 0.2 % (ref 0–0.5)
LDLC SERPL CALC-MCNC: 144.4 MG/DL (ref 63–159)
LYMPHOCYTES # BLD AUTO: 2.5 K/UL (ref 1–4.8)
LYMPHOCYTES NFR BLD: 48.2 % (ref 18–48)
MCH RBC QN AUTO: 28.4 PG (ref 27–31)
MCHC RBC AUTO-ENTMCNC: 31.3 G/DL (ref 32–36)
MCV RBC AUTO: 91 FL (ref 82–98)
MONOCYTES # BLD AUTO: 0.4 K/UL (ref 0.3–1)
MONOCYTES NFR BLD: 8 % (ref 4–15)
NEUTROPHILS # BLD AUTO: 2 K/UL (ref 1.8–7.7)
NEUTROPHILS NFR BLD: 38.3 % (ref 38–73)
NONHDLC SERPL-MCNC: 167 MG/DL
NRBC BLD-RTO: 0 /100 WBC
PLATELET # BLD AUTO: 259 K/UL (ref 150–450)
PMV BLD AUTO: 10.5 FL (ref 9.2–12.9)
POTASSIUM SERPL-SCNC: 4.7 MMOL/L (ref 3.5–5.1)
PROT SERPL-MCNC: 7.6 G/DL (ref 6–8.4)
RBC # BLD AUTO: 4.4 M/UL (ref 4–5.4)
SODIUM SERPL-SCNC: 140 MMOL/L (ref 136–145)
TRIGL SERPL-MCNC: 113 MG/DL (ref 30–150)
TSH SERPL DL<=0.005 MIU/L-ACNC: 1.8 UIU/ML (ref 0.4–4)
WBC # BLD AUTO: 5.14 K/UL (ref 3.9–12.7)

## 2021-10-25 PROCEDURE — 80053 COMPREHEN METABOLIC PANEL: CPT | Performed by: INTERNAL MEDICINE

## 2021-10-25 PROCEDURE — 84443 ASSAY THYROID STIM HORMONE: CPT | Performed by: INTERNAL MEDICINE

## 2021-10-25 PROCEDURE — 80061 LIPID PANEL: CPT | Performed by: INTERNAL MEDICINE

## 2021-10-25 PROCEDURE — 36415 COLL VENOUS BLD VENIPUNCTURE: CPT | Mod: PO | Performed by: INTERNAL MEDICINE

## 2021-10-25 PROCEDURE — 85025 COMPLETE CBC W/AUTO DIFF WBC: CPT | Performed by: INTERNAL MEDICINE

## 2021-10-25 PROCEDURE — 83036 HEMOGLOBIN GLYCOSYLATED A1C: CPT | Performed by: INTERNAL MEDICINE

## 2022-11-09 LAB — PAP RECOMMENDATION EXT: NORMAL

## 2023-01-30 ENCOUNTER — PATIENT OUTREACH (OUTPATIENT)
Dept: ADMINISTRATIVE | Facility: HOSPITAL | Age: 38
End: 2023-01-30
Payer: COMMERCIAL

## 2023-01-30 ENCOUNTER — LAB VISIT (OUTPATIENT)
Dept: LAB | Facility: HOSPITAL | Age: 38
End: 2023-01-30
Attending: INTERNAL MEDICINE
Payer: COMMERCIAL

## 2023-01-30 ENCOUNTER — OFFICE VISIT (OUTPATIENT)
Dept: FAMILY MEDICINE | Facility: CLINIC | Age: 38
End: 2023-01-30
Payer: COMMERCIAL

## 2023-01-30 VITALS
WEIGHT: 126.31 LBS | HEIGHT: 60 IN | OXYGEN SATURATION: 99 % | SYSTOLIC BLOOD PRESSURE: 100 MMHG | DIASTOLIC BLOOD PRESSURE: 80 MMHG | BODY MASS INDEX: 24.8 KG/M2 | TEMPERATURE: 99 F | HEART RATE: 82 BPM

## 2023-01-30 DIAGNOSIS — Z00.00 ANNUAL PHYSICAL EXAM: Primary | ICD-10-CM

## 2023-01-30 DIAGNOSIS — Z00.00 ANNUAL PHYSICAL EXAM: ICD-10-CM

## 2023-01-30 LAB
ALBUMIN SERPL BCP-MCNC: 3.8 G/DL (ref 3.5–5.2)
ALP SERPL-CCNC: 47 U/L (ref 55–135)
ALT SERPL W/O P-5'-P-CCNC: 13 U/L (ref 10–44)
ANION GAP SERPL CALC-SCNC: 6 MMOL/L (ref 8–16)
AST SERPL-CCNC: 15 U/L (ref 10–40)
BASOPHILS # BLD AUTO: 0.03 K/UL (ref 0–0.2)
BASOPHILS NFR BLD: 0.5 % (ref 0–1.9)
BILIRUB SERPL-MCNC: 0.4 MG/DL (ref 0.1–1)
BUN SERPL-MCNC: 12 MG/DL (ref 6–20)
CALCIUM SERPL-MCNC: 8.7 MG/DL (ref 8.7–10.5)
CHLORIDE SERPL-SCNC: 105 MMOL/L (ref 95–110)
CHOLEST SERPL-MCNC: 214 MG/DL (ref 120–199)
CHOLEST/HDLC SERPL: 3.7 {RATIO} (ref 2–5)
CO2 SERPL-SCNC: 28 MMOL/L (ref 23–29)
CREAT SERPL-MCNC: 0.7 MG/DL (ref 0.5–1.4)
DIFFERENTIAL METHOD: NORMAL
EOSINOPHIL # BLD AUTO: 0.3 K/UL (ref 0–0.5)
EOSINOPHIL NFR BLD: 4.6 % (ref 0–8)
ERYTHROCYTE [DISTWIDTH] IN BLOOD BY AUTOMATED COUNT: 12.5 % (ref 11.5–14.5)
EST. GFR  (NO RACE VARIABLE): >60 ML/MIN/1.73 M^2
ESTIMATED AVG GLUCOSE: 105 MG/DL (ref 68–131)
GLUCOSE SERPL-MCNC: 86 MG/DL (ref 70–110)
HBA1C MFR BLD: 5.3 % (ref 4–5.6)
HCT VFR BLD AUTO: 38.6 % (ref 37–48.5)
HDLC SERPL-MCNC: 58 MG/DL (ref 40–75)
HDLC SERPL: 27.1 % (ref 20–50)
HGB BLD-MCNC: 12.5 G/DL (ref 12–16)
IMM GRANULOCYTES # BLD AUTO: 0.01 K/UL (ref 0–0.04)
IMM GRANULOCYTES NFR BLD AUTO: 0.2 % (ref 0–0.5)
LDLC SERPL CALC-MCNC: 127.6 MG/DL (ref 63–159)
LYMPHOCYTES # BLD AUTO: 2 K/UL (ref 1–4.8)
LYMPHOCYTES NFR BLD: 33.9 % (ref 18–48)
MCH RBC QN AUTO: 28.9 PG (ref 27–31)
MCHC RBC AUTO-ENTMCNC: 32.4 G/DL (ref 32–36)
MCV RBC AUTO: 89 FL (ref 82–98)
MONOCYTES # BLD AUTO: 0.4 K/UL (ref 0.3–1)
MONOCYTES NFR BLD: 7.2 % (ref 4–15)
NEUTROPHILS # BLD AUTO: 3.1 K/UL (ref 1.8–7.7)
NEUTROPHILS NFR BLD: 53.6 % (ref 38–73)
NONHDLC SERPL-MCNC: 156 MG/DL
NRBC BLD-RTO: 0 /100 WBC
PLATELET # BLD AUTO: 271 K/UL (ref 150–450)
PMV BLD AUTO: 9.8 FL (ref 9.2–12.9)
POTASSIUM SERPL-SCNC: 4.4 MMOL/L (ref 3.5–5.1)
PROT SERPL-MCNC: 7.4 G/DL (ref 6–8.4)
RBC # BLD AUTO: 4.33 M/UL (ref 4–5.4)
SODIUM SERPL-SCNC: 139 MMOL/L (ref 136–145)
TRIGL SERPL-MCNC: 142 MG/DL (ref 30–150)
TSH SERPL DL<=0.005 MIU/L-ACNC: 1.6 UIU/ML (ref 0.4–4)
WBC # BLD AUTO: 5.84 K/UL (ref 3.9–12.7)

## 2023-01-30 PROCEDURE — 3008F BODY MASS INDEX DOCD: CPT | Mod: CPTII,S$GLB,, | Performed by: INTERNAL MEDICINE

## 2023-01-30 PROCEDURE — 3079F DIAST BP 80-89 MM HG: CPT | Mod: CPTII,S$GLB,, | Performed by: INTERNAL MEDICINE

## 2023-01-30 PROCEDURE — 3074F PR MOST RECENT SYSTOLIC BLOOD PRESSURE < 130 MM HG: ICD-10-PCS | Mod: CPTII,S$GLB,, | Performed by: INTERNAL MEDICINE

## 2023-01-30 PROCEDURE — 99999 PR PBB SHADOW E&M-EST. PATIENT-LVL III: CPT | Mod: PBBFAC,,, | Performed by: INTERNAL MEDICINE

## 2023-01-30 PROCEDURE — 99395 PREV VISIT EST AGE 18-39: CPT | Mod: S$GLB,,, | Performed by: INTERNAL MEDICINE

## 2023-01-30 PROCEDURE — 3074F SYST BP LT 130 MM HG: CPT | Mod: CPTII,S$GLB,, | Performed by: INTERNAL MEDICINE

## 2023-01-30 PROCEDURE — 83036 HEMOGLOBIN GLYCOSYLATED A1C: CPT | Performed by: INTERNAL MEDICINE

## 2023-01-30 PROCEDURE — 84443 ASSAY THYROID STIM HORMONE: CPT | Performed by: INTERNAL MEDICINE

## 2023-01-30 PROCEDURE — 3079F PR MOST RECENT DIASTOLIC BLOOD PRESSURE 80-89 MM HG: ICD-10-PCS | Mod: CPTII,S$GLB,, | Performed by: INTERNAL MEDICINE

## 2023-01-30 PROCEDURE — 80053 COMPREHEN METABOLIC PANEL: CPT | Performed by: INTERNAL MEDICINE

## 2023-01-30 PROCEDURE — 1159F PR MEDICATION LIST DOCUMENTED IN MEDICAL RECORD: ICD-10-PCS | Mod: CPTII,S$GLB,, | Performed by: INTERNAL MEDICINE

## 2023-01-30 PROCEDURE — 99999 PR PBB SHADOW E&M-EST. PATIENT-LVL III: ICD-10-PCS | Mod: PBBFAC,,, | Performed by: INTERNAL MEDICINE

## 2023-01-30 PROCEDURE — 36415 COLL VENOUS BLD VENIPUNCTURE: CPT | Mod: PN | Performed by: INTERNAL MEDICINE

## 2023-01-30 PROCEDURE — 1160F PR REVIEW ALL MEDS BY PRESCRIBER/CLIN PHARMACIST DOCUMENTED: ICD-10-PCS | Mod: CPTII,S$GLB,, | Performed by: INTERNAL MEDICINE

## 2023-01-30 PROCEDURE — 1160F RVW MEDS BY RX/DR IN RCRD: CPT | Mod: CPTII,S$GLB,, | Performed by: INTERNAL MEDICINE

## 2023-01-30 PROCEDURE — 3008F PR BODY MASS INDEX (BMI) DOCUMENTED: ICD-10-PCS | Mod: CPTII,S$GLB,, | Performed by: INTERNAL MEDICINE

## 2023-01-30 PROCEDURE — 85025 COMPLETE CBC W/AUTO DIFF WBC: CPT | Performed by: INTERNAL MEDICINE

## 2023-01-30 PROCEDURE — 99395 PR PREVENTIVE VISIT,EST,18-39: ICD-10-PCS | Mod: S$GLB,,, | Performed by: INTERNAL MEDICINE

## 2023-01-30 PROCEDURE — 1159F MED LIST DOCD IN RCRD: CPT | Mod: CPTII,S$GLB,, | Performed by: INTERNAL MEDICINE

## 2023-01-30 PROCEDURE — 80061 LIPID PANEL: CPT | Performed by: INTERNAL MEDICINE

## 2023-01-30 RX ORDER — VALACYCLOVIR HYDROCHLORIDE 1 G/1
TABLET, FILM COATED ORAL
COMMUNITY
Start: 2022-11-07

## 2023-01-30 RX ORDER — DIPHENHYDRAMINE HCL 25 MG
TABLET ORAL
COMMUNITY

## 2023-01-30 NOTE — PROGRESS NOTES
SUBJECTIVE     Chief Complaint   Patient presents with    Annual Exam       HPI  Alisa Grigsby is a 37 y.o. female with multiple medical diagnoses as listed in the medical history and problem list that presents for annual exam. Pt has been doing well since her last visit. She has a good appetite and eats well. She does not exercise. She sleeps for ~6 hours nightly. Pt does take OTC supplements, which is an occasional MVI and a fiber supplement. She does not have any current stressors. Pt is UTD on age appropriate [place] screening.    PAST MEDICAL HISTORY:  Past Medical History:   Diagnosis Date    Allergy        PAST SURGICAL HISTORY:  Past Surgical History:   Procedure Laterality Date     SECTION N/A 3/31/2020    Procedure:  SECTION;  Surgeon: Angelica Covington MD;  Location: [facility] L&D OR;  Service: OB/GYN;  Laterality: N/A;       SOCIAL HISTORY:  Social History     Socioeconomic History    Marital status:    Tobacco Use    Smoking status: Never    Smokeless tobacco: Never   Substance and Sexual Activity    Alcohol use: No    Drug use: No    Sexual activity: Yes     Partners: Male     Birth control/protection: None     Social Determinants of Health     Financial Resource Strain: Low Risk     Difficulty of Paying Living Expenses: Not hard at all   Food Insecurity: No Food Insecurity    Worried About Running Out of Food in the Last Year: Never true    Ran Out of Food in the Last Year: Never true   Transportation Needs: No Transportation Needs    Lack of Transportation (Medical): No    Lack of Transportation (Non-Medical): No   Physical Activity: Inactive    Days of Exercise per Week: 0 days    Minutes of Exercise per Session: 0 min   Stress: No Stress Concern Present    Feeling of Stress : Only a little   Social Connections: Unknown    Frequency of Communication with Friends and Family: More than three times a week    Frequency of Social Gatherings with Friends and Family: Once a week    Active  Member of Clubs or Organizations: No    Attends Club or Organization Meetings: Never    Marital Status:    Housing Stability: Low Risk     Unable to Pay for Housing in the Last Year: No    Number of Places Lived in the Last Year: 1    Unstable Housing in the Last Year: No       FAMILY HISTORY:  Family History   Problem Relation Age of Onset    Hyperlipidemia Mother     Hyperlipidemia Father     Hypothyroidism Sister     No Known Problems Brother     No Known Problems Daughter     Stroke Paternal Grandmother     Breast cancer Neg Hx     Colon cancer Neg Hx     Ovarian cancer Neg Hx        ALLERGIES AND MEDICATIONS: updated and reviewed.  Review of patient's allergies indicates:   Allergen Reactions    Ibuprofen Swelling     Swollen eyelids    Nsaids (non-steroidal anti-inflammatory drug) Swelling     Current Outpatient Medications   Medication Sig Dispense Refill    diphenhydrAMINE (SOMINEX) 25 mg tablet       etonogestreL (NEXPLANON) 68 mg Impl subdermal device Inject 1 each into the skin.      penciclovir (DENAVIR) 1 % cream Apply to affected areas every 2 hours while awake, for 4 days 5 g 2    valACYclovir (VALTREX) 1000 MG tablet SMARTSI Tablet(s) By Mouth Morning-Night      triamcinolone (KENALOG) 0.5 % ointment Apply topically 2 (two) times daily. for 10 days 60 g 2     No current facility-administered medications for this visit.       ROS  Review of Systems   Constitutional:  Negative for chills and fever.   HENT:  Negative for hearing loss and sore throat.    Eyes:  Negative for visual disturbance.   Respiratory:  Negative for cough and shortness of breath.    Cardiovascular:  Negative for chest pain, palpitations and leg swelling.   Gastrointestinal:  Positive for nausea. Negative for abdominal pain, constipation, diarrhea and vomiting.   Genitourinary:  Negative for dysuria, frequency and urgency.   Musculoskeletal:  Negative for arthralgias, joint swelling and myalgias.   Skin:  Negative for rash  and wound.   Neurological:  Positive for dizziness (feeling unsteady on her feet). Negative for headaches.   Psychiatric/Behavioral:  Negative for agitation and confusion. The patient is not nervous/anxious.        OBJECTIVE     Physical Exam  Vitals:    01/30/23 0810   BP: 100/80   Pulse: 82   Temp: 98.9 °F (37.2 °C)    Body mass index is 24.67 kg/m².  Weight: 57.3 kg (126 lb 5.2 oz)   Height: 5' (152.4 cm)     Physical Exam  Constitutional:       General: She is not in acute distress.     Appearance: She is well-developed.   HENT:      Head: Normocephalic and atraumatic.      Right Ear: External ear normal.      Left Ear: External ear normal.      Ears:      Comments: L ear cerumen     Nose: Nose normal.   Eyes:      General: No scleral icterus.        Right eye: No discharge.         Left eye: No discharge.      Conjunctiva/sclera: Conjunctivae normal.   Neck:      Vascular: No JVD.      Trachea: No tracheal deviation.   Cardiovascular:      Rate and Rhythm: Normal rate and regular rhythm.      Heart sounds: No murmur heard.    No friction rub. No gallop.   Pulmonary:      Effort: Pulmonary effort is normal. No respiratory distress.      Breath sounds: Normal breath sounds. No wheezing.   Abdominal:      General: Bowel sounds are normal. There is no distension.      Palpations: Abdomen is soft. There is no mass.      Tenderness: There is no abdominal tenderness. There is no guarding or rebound.   Musculoskeletal:         General: No tenderness or deformity. Normal range of motion.      Cervical back: Normal range of motion and neck supple.   Skin:     General: Skin is warm and dry.      Findings: No erythema or rash.   Neurological:      Mental Status: She is alert and oriented to person, place, and time.      Motor: No abnormal muscle tone.      Coordination: Coordination normal.   Psychiatric:         Behavior: Behavior normal.         Thought Content: Thought content normal.         Judgment: Judgment normal.          Health Maintenance         Date Due Completion Date    COVID-19 Vaccine (4 - Booster for Moderna series) 01/28/2022 12/3/2021    Cervical Cancer Screening 10/22/2022 10/22/2021 (Done)    Override on 10/22/2021: Done    TETANUS VACCINE 01/16/2030 1/16/2020              ASSESSMENT     37 y.o. female with     1. Annual physical exam        PLAN:     1. Annual physical exam  - Counseled on age appropriate medical preventative services, including age appropriate cancer screenings, over all nutritional health, need for a consistent exercise regimen and an over all push towards maintaining a vigorous and active lifestyle.  Counseled on age appropriate vaccines and discussed upcoming health care needs based on age/gender.  Spent time with patient counseling on need for a good patient/doctor relationship moving forward.  Discussed use of common OTC medications and supplements.  Discussed common dietary aids and use of caffeine and the need for good sleep hygiene and stress management.  - CBC Auto Differential; Future  - Comprehensive Metabolic Panel; Future  - Hemoglobin A1C; Future  - TSH; Future  - Lipid Panel; Future        RTC in 1 year     Nguyen Baird MD  01/30/2023 8:23 AM        No follow-ups on file.

## 2024-03-09 ENCOUNTER — OFFICE VISIT (OUTPATIENT)
Dept: FAMILY MEDICINE | Facility: CLINIC | Age: 39
End: 2024-03-09
Payer: COMMERCIAL

## 2024-03-09 VITALS
BODY MASS INDEX: 22.64 KG/M2 | HEART RATE: 87 BPM | TEMPERATURE: 98 F | DIASTOLIC BLOOD PRESSURE: 68 MMHG | HEIGHT: 60 IN | OXYGEN SATURATION: 97 % | WEIGHT: 115.31 LBS | SYSTOLIC BLOOD PRESSURE: 110 MMHG

## 2024-03-09 DIAGNOSIS — Z00.00 ANNUAL PHYSICAL EXAM: Primary | ICD-10-CM

## 2024-03-09 DIAGNOSIS — L30.9 ECZEMA OF BOTH HANDS: ICD-10-CM

## 2024-03-09 PROCEDURE — 1159F MED LIST DOCD IN RCRD: CPT | Mod: CPTII,S$GLB,, | Performed by: INTERNAL MEDICINE

## 2024-03-09 PROCEDURE — 1160F RVW MEDS BY RX/DR IN RCRD: CPT | Mod: CPTII,S$GLB,, | Performed by: INTERNAL MEDICINE

## 2024-03-09 PROCEDURE — 99395 PREV VISIT EST AGE 18-39: CPT | Mod: S$GLB,,, | Performed by: INTERNAL MEDICINE

## 2024-03-09 PROCEDURE — 3008F BODY MASS INDEX DOCD: CPT | Mod: CPTII,S$GLB,, | Performed by: INTERNAL MEDICINE

## 2024-03-09 PROCEDURE — 3078F DIAST BP <80 MM HG: CPT | Mod: CPTII,S$GLB,, | Performed by: INTERNAL MEDICINE

## 2024-03-09 PROCEDURE — 3074F SYST BP LT 130 MM HG: CPT | Mod: CPTII,S$GLB,, | Performed by: INTERNAL MEDICINE

## 2024-03-09 PROCEDURE — 99999 PR PBB SHADOW E&M-EST. PATIENT-LVL III: CPT | Mod: PBBFAC,,, | Performed by: INTERNAL MEDICINE

## 2024-03-09 RX ORDER — TRIAMCINOLONE ACETONIDE 5 MG/G
CREAM TOPICAL 2 TIMES DAILY
Qty: 15 G | Refills: 2 | Status: SHIPPED | OUTPATIENT
Start: 2024-03-09

## 2024-03-09 NOTE — PROGRESS NOTES
SUBJECTIVE     Chief Complaint   Patient presents with    Annual Exam       HPI  Alisa Grigsby is a 38 y.o. female with multiple medical diagnoses as listed in the medical history and problem list that presents for annual exam. Pt has been doing well since her last visit. She has a good appetite and eats well. She does not exercise. She sleeps for ~6-7 hours nightly, but it is broken. Pt does take OTC supplements, which is collagen and fiber powder. She does not have any current stressors. Pt is UTD on age appropriate CA screening.    PAST MEDICAL HISTORY:  Past Medical History:   Diagnosis Date    Allergy        PAST SURGICAL HISTORY:  Past Surgical History:   Procedure Laterality Date     SECTION N/A 2020    Procedure:  SECTION;  Surgeon: Angelica Covington MD;  Location: [facility] L&D OR;  Service: OB/GYN;  Laterality: N/A;     SECTION  3/31/20       SOCIAL HISTORY:  Social History     Socioeconomic History    Marital status:    Tobacco Use    Smoking status: Never    Smokeless tobacco: Never   Substance and Sexual Activity    Alcohol use: No    Drug use: No    Sexual activity: Yes     Partners: Male     Birth control/protection: Implant     Social Determinants of Health     Financial Resource Strain: Low Risk  (3/8/2024)    Overall Financial Resource Strain (CARDIA)     Difficulty of Paying Living Expenses: Not hard at all   Food Insecurity: No Food Insecurity (3/8/2024)    Hunger Vital Sign     Worried About Running Out of Food in the Last Year: Never true     Ran Out of Food in the Last Year: Never true   Transportation Needs: No Transportation Needs (3/8/2024)    PRAPARE - Transportation     Lack of Transportation (Medical): No     Lack of Transportation (Non-Medical): No   Physical Activity: Inactive (3/8/2024)    Exercise Vital Sign     Days of Exercise per Week: 0 days     Minutes of Exercise per Session: 0 min   Stress: No Stress Concern Present (3/8/2024)    [employer] - Occupational Stress Questionnaire     Feeling of Stress : Only a little   Social Connections: Unknown (3/8/2024)    Social Connection and Isolation Panel [NHANES]     Frequency of Communication with Friends and Family: More than three times a week     Frequency of Social Gatherings with Friends and Family: Once a week     Active Member of Clubs or Organizations: No     Attends Club or Organization Meetings: Never     Marital Status:    Housing Stability: Low Risk  (3/8/2024)    Housing Stability Vital Sign     Unable to Pay for Housing in the Last Year: No     Number of Places Lived in the Last Year: 1     Unstable Housing in the Last Year: No       FAMILY HISTORY:  Family History   Problem Relation Age of Onset    Hyperlipidemia Mother     Hyperlipidemia Father     Hypothyroidism Sister     No Known Problems Brother     No Known Problems Daughter     Stroke Paternal Grandmother     Hyperlipidemia Paternal Grandmother     Breast cancer Neg Hx     Colon cancer Neg Hx     Ovarian cancer Neg Hx        ALLERGIES AND MEDICATIONS: updated and reviewed.  Review of patient's allergies indicates:   Allergen Reactions    Ibuprofen Swelling     Swollen eyelids    Nsaids (non-steroidal anti-inflammatory drug) Swelling     Current Outpatient Medications   Medication Sig Dispense Refill    diphenhydrAMINE (SOMINEX) 25 mg tablet       levonorgestreL (MIRENA) 21 mcg/24 hours (8 yrs) 52 mg IUD       penciclovir (DENAVIR) 1 % cream Apply to affected areas every 2 hours while awake, for 4 days 5 g 2    valACYclovir (VALTREX) 1000 MG tablet SMARTSI Tablet(s) By Mouth Morning-Night      triamcinolone acetonide 0.5% (KENALOG) 0.5 % Crea Apply topically 2 (two) times daily. 15 g 2     No current facility-administered medications for this visit.       ROS  Review of Systems   Constitutional:  Negative for activity change and unexpected weight change.   HENT:  Negative for hearing loss,  rhinorrhea and trouble swallowing.    Eyes:  Negative for discharge and visual disturbance.   Respiratory:  Negative for chest tightness and wheezing.    Cardiovascular:  Negative for chest pain and palpitations.   Gastrointestinal:  Negative for blood in stool, constipation, diarrhea and vomiting.   Endocrine: Negative for polydipsia and polyuria.   Genitourinary:  Negative for difficulty urinating, dysuria, hematuria and menstrual problem.   Musculoskeletal:  Negative for arthralgias, joint swelling and neck pain.   Skin:  Negative for rash and wound.   Neurological:  Negative for weakness and headaches.   Psychiatric/Behavioral:  Negative for confusion and dysphoric mood.          OBJECTIVE     Physical Exam  Vitals:    03/09/24 1045   BP: 110/68   Pulse: 87   Temp: 98.1 °F (36.7 °C)    Body mass index is 22.52 kg/m².  Weight: 52.3 kg (115 lb 4.8 oz)   Height: 5' (152.4 cm)     Physical Exam  Constitutional:       General: She is not in acute distress.     Appearance: She is well-developed.   HENT:      Head: Normocephalic and atraumatic.      Right Ear: Tympanic membrane, ear canal and external ear normal.      Left Ear: Tympanic membrane and external ear normal.      Ears:      Comments: L ear cerumen     Nose: Nose normal.   Eyes:      General: No scleral icterus.        Right eye: No discharge.         Left eye: No discharge.      Conjunctiva/sclera: Conjunctivae normal.   Neck:      Vascular: No JVD.      Trachea: No tracheal deviation.   Cardiovascular:      Rate and Rhythm: Normal rate and regular rhythm.      Heart sounds: No murmur heard.     No friction rub. No gallop.   Pulmonary:      Effort: Pulmonary effort is normal. No respiratory distress.      Breath sounds: Normal breath sounds. No wheezing.   Abdominal:      General: Bowel sounds are normal. There is no distension.      Palpations: Abdomen is soft. There is no mass.      Tenderness: There is no abdominal tenderness. There is no guarding or  rebound.   Musculoskeletal:         General: No tenderness or deformity. Normal range of motion.      Cervical back: Normal range of motion and neck supple.   Skin:     General: Skin is warm and dry.      Findings: No erythema or rash.   Neurological:      Mental Status: She is alert and oriented to person, place, and time.      Motor: No abnormal muscle tone.      Coordination: Coordination normal.   Psychiatric:         Behavior: Behavior normal.         Thought Content: Thought content normal.         Judgment: Judgment normal.           Health Maintenance         Date Due Completion Date    COVID-19 Vaccine (4 - 2023-24 season) 09/01/2023 12/3/2021    Lipid Panel 01/30/2024 1/30/2023    Cervical Cancer Screening 11/10/2026 11/10/2023    Override on 10/22/2021: Done    TETANUS VACCINE 01/16/2030 1/16/2020              ASSESSMENT     38 y.o. female with     1. Annual physical exam    2. Eczema of both hands        PLAN:     1. Annual physical exam  - Counseled on age appropriate medical preventative services, including age appropriate cancer screenings, over all nutritional health, need for a consistent exercise regimen and an over all push towards maintaining a vigorous and active lifestyle.  Counseled on age appropriate vaccines and discussed upcoming health care needs based on age/gender.  Spent time with patient counseling on need for a good patient/doctor relationship moving forward.  Discussed use of common OTC medications and supplements.  Discussed common dietary aids and use of caffeine and the need for good sleep hygiene and stress management.  - CBC Auto Differential; Future  - Comprehensive Metabolic Panel; Future  - Hemoglobin A1C; Future  - TSH; Future  - Lipid Panel; Future    2. Eczema of both hands  - Stable; no acute issues  - The current medical regimen is effective;  continue present plan and medications.  - triamcinolone acetonide 0.5% (KENALOG) 0.5 % Crea; Apply topically 2 (two) times daily.   Dispense: 15 g; Refill: 2        RTC in 1 year     Nguyen Baird MD  03/09/2024 10:51 AM        No follow-ups on file.

## 2024-03-15 ENCOUNTER — LAB VISIT (OUTPATIENT)
Dept: LAB | Facility: HOSPITAL | Age: 39
End: 2024-03-15
Attending: INTERNAL MEDICINE
Payer: COMMERCIAL

## 2024-03-15 DIAGNOSIS — Z00.00 ANNUAL PHYSICAL EXAM: ICD-10-CM

## 2024-03-15 LAB
ALBUMIN SERPL BCP-MCNC: 4 G/DL (ref 3.5–5.2)
ALP SERPL-CCNC: 51 U/L (ref 55–135)
ALT SERPL W/O P-5'-P-CCNC: 13 U/L (ref 10–44)
ANION GAP SERPL CALC-SCNC: 8 MMOL/L (ref 8–16)
AST SERPL-CCNC: 17 U/L (ref 10–40)
BASOPHILS # BLD AUTO: 0.05 K/UL (ref 0–0.2)
BASOPHILS NFR BLD: 0.7 % (ref 0–1.9)
BILIRUB SERPL-MCNC: 0.4 MG/DL (ref 0.1–1)
BUN SERPL-MCNC: 12 MG/DL (ref 6–20)
CALCIUM SERPL-MCNC: 9.1 MG/DL (ref 8.7–10.5)
CHLORIDE SERPL-SCNC: 104 MMOL/L (ref 95–110)
CHOLEST SERPL-MCNC: 248 MG/DL (ref 120–199)
CHOLEST/HDLC SERPL: 4.1 {RATIO} (ref 2–5)
CO2 SERPL-SCNC: 25 MMOL/L (ref 23–29)
CREAT SERPL-MCNC: 0.8 MG/DL (ref 0.5–1.4)
DIFFERENTIAL METHOD BLD: NORMAL
EOSINOPHIL # BLD AUTO: 0.3 K/UL (ref 0–0.5)
EOSINOPHIL NFR BLD: 4.5 % (ref 0–8)
ERYTHROCYTE [DISTWIDTH] IN BLOOD BY AUTOMATED COUNT: 12.6 % (ref 11.5–14.5)
EST. GFR  (NO RACE VARIABLE): >60 ML/MIN/1.73 M^2
ESTIMATED AVG GLUCOSE: 105 MG/DL (ref 68–131)
GLUCOSE SERPL-MCNC: 85 MG/DL (ref 70–110)
HBA1C MFR BLD: 5.3 % (ref 4–5.6)
HCT VFR BLD AUTO: 40 % (ref 37–48.5)
HDLC SERPL-MCNC: 60 MG/DL (ref 40–75)
HDLC SERPL: 24.2 % (ref 20–50)
HGB BLD-MCNC: 12.8 G/DL (ref 12–16)
IMM GRANULOCYTES # BLD AUTO: 0.01 K/UL (ref 0–0.04)
IMM GRANULOCYTES NFR BLD AUTO: 0.1 % (ref 0–0.5)
LDLC SERPL CALC-MCNC: 177.2 MG/DL (ref 63–159)
LYMPHOCYTES # BLD AUTO: 2.7 K/UL (ref 1–4.8)
LYMPHOCYTES NFR BLD: 40.5 % (ref 18–48)
MCH RBC QN AUTO: 29.5 PG (ref 27–31)
MCHC RBC AUTO-ENTMCNC: 32 G/DL (ref 32–36)
MCV RBC AUTO: 92 FL (ref 82–98)
MONOCYTES # BLD AUTO: 0.5 K/UL (ref 0.3–1)
MONOCYTES NFR BLD: 7.4 % (ref 4–15)
NEUTROPHILS # BLD AUTO: 3.2 K/UL (ref 1.8–7.7)
NEUTROPHILS NFR BLD: 46.8 % (ref 38–73)
NONHDLC SERPL-MCNC: 188 MG/DL
NRBC BLD-RTO: 0 /100 WBC
PLATELET # BLD AUTO: 265 K/UL (ref 150–450)
PMV BLD AUTO: 9.9 FL (ref 9.2–12.9)
POTASSIUM SERPL-SCNC: 4.1 MMOL/L (ref 3.5–5.1)
PROT SERPL-MCNC: 7.1 G/DL (ref 6–8.4)
RBC # BLD AUTO: 4.34 M/UL (ref 4–5.4)
SODIUM SERPL-SCNC: 137 MMOL/L (ref 136–145)
TRIGL SERPL-MCNC: 54 MG/DL (ref 30–150)
TSH SERPL DL<=0.005 MIU/L-ACNC: 3.25 UIU/ML (ref 0.4–4)
WBC # BLD AUTO: 6.74 K/UL (ref 3.9–12.7)

## 2024-03-15 PROCEDURE — 84443 ASSAY THYROID STIM HORMONE: CPT | Performed by: INTERNAL MEDICINE

## 2024-03-15 PROCEDURE — 80053 COMPREHEN METABOLIC PANEL: CPT | Performed by: INTERNAL MEDICINE

## 2024-03-15 PROCEDURE — 80061 LIPID PANEL: CPT | Performed by: INTERNAL MEDICINE

## 2024-03-15 PROCEDURE — 85025 COMPLETE CBC W/AUTO DIFF WBC: CPT | Performed by: INTERNAL MEDICINE

## 2024-03-15 PROCEDURE — 36415 COLL VENOUS BLD VENIPUNCTURE: CPT | Mod: PO | Performed by: INTERNAL MEDICINE

## 2024-03-15 PROCEDURE — 83036 HEMOGLOBIN GLYCOSYLATED A1C: CPT | Performed by: INTERNAL MEDICINE

## 2024-03-18 DIAGNOSIS — E78.00 PURE HYPERCHOLESTEROLEMIA: Primary | ICD-10-CM

## 2024-03-18 RX ORDER — ATORVASTATIN CALCIUM 40 MG/1
40 TABLET, FILM COATED ORAL DAILY
Qty: 90 TABLET | Refills: 3 | Status: SHIPPED | OUTPATIENT
Start: 2024-03-18 | End: 2025-03-18

## 2025-03-13 ENCOUNTER — OFFICE VISIT (OUTPATIENT)
Dept: FAMILY MEDICINE | Facility: CLINIC | Age: 40
End: 2025-03-13
Payer: COMMERCIAL

## 2025-03-13 VITALS
SYSTOLIC BLOOD PRESSURE: 102 MMHG | HEART RATE: 77 BPM | WEIGHT: 125 LBS | BODY MASS INDEX: 24.54 KG/M2 | DIASTOLIC BLOOD PRESSURE: 74 MMHG | HEIGHT: 60 IN | OXYGEN SATURATION: 99 % | TEMPERATURE: 98 F

## 2025-03-13 DIAGNOSIS — R25.1 TREMOR OF BOTH HANDS: ICD-10-CM

## 2025-03-13 DIAGNOSIS — Z00.00 ANNUAL PHYSICAL EXAM: Primary | ICD-10-CM

## 2025-03-13 DIAGNOSIS — E78.00 PURE HYPERCHOLESTEROLEMIA: ICD-10-CM

## 2025-03-13 PROCEDURE — 99999 PR PBB SHADOW E&M-EST. PATIENT-LVL IV: CPT | Mod: PBBFAC,,, | Performed by: INTERNAL MEDICINE

## 2025-03-13 RX ORDER — ATORVASTATIN CALCIUM 40 MG/1
40 TABLET, FILM COATED ORAL DAILY
Qty: 90 TABLET | Refills: 3 | Status: SHIPPED | OUTPATIENT
Start: 2025-03-13 | End: 2026-03-13

## 2025-03-13 RX ORDER — UBIDECARENONE 100 MG
CAPSULE ORAL
COMMUNITY

## 2025-03-13 NOTE — PROGRESS NOTES
SUBJECTIVE     Chief Complaint   Patient presents with    Annual Exam       HPI  Alisa Grigsby is a 39 y.o. female with multiple medical diagnoses as listed in the medical history and problem list that presents for annual exam. Pt has been doing well since her last visit. She has a good appetite and eats well. She does exercise for 5-10 min on the stair master. She sleeps for ~5-6 hours nightly. Pt does take OTC supplements, which is Mg and MVI. She does have any current stressors, but watches a Gucash series to de-stress. Pt is UTD on age appropriate CA screening.    PAST MEDICAL HISTORY:  Past Medical History:   Diagnosis Date    Allergy        PAST SURGICAL HISTORY:  Past Surgical History:   Procedure Laterality Date     SECTION N/A 2020    Procedure:  SECTION;  Surgeon: Angelica Covington MD;  Location: [facility] L&D OR;  Service: OB/GYN;  Laterality: N/A;     SECTION  3/31/20       SOCIAL HISTORY:  Social History[1]    FAMILY HISTORY:  Family History   Problem Relation Name Age of Onset    Hyperlipidemia Mother      Hyperlipidemia Father Father     Hypothyroidism Sister      No Known Problems Brother      No Known Problems Daughter      Stroke Paternal Grandmother Grandmother     Hyperlipidemia Paternal Grandmother Grandmother     Breast cancer Neg Hx      Colon cancer Neg Hx      Ovarian cancer Neg Hx         ALLERGIES AND MEDICATIONS: updated and reviewed.  Review of patient's allergies indicates:   Allergen Reactions    Ibuprofen Swelling     Swollen eyelids    Nsaids (non-steroidal anti-inflammatory drug) Swelling     Current Medications[2]    ROS  Review of Systems   Constitutional:  Negative for chills and fever.   HENT:  Negative for hearing loss and sore throat.    Eyes:  Negative for visual disturbance.   Respiratory:  Negative for cough and shortness of breath.    Cardiovascular:  Negative for chest pain, palpitations and leg swelling.   Gastrointestinal:  Negative for  abdominal pain, constipation, diarrhea, nausea and vomiting.   Genitourinary:  Negative for dysuria, frequency and urgency.   Musculoskeletal:  Negative for arthralgias, joint swelling and myalgias.   Skin:  Negative for rash and wound.   Neurological:  Positive for tremors (when trying to steady her hands and use her hands). Negative for headaches.   Psychiatric/Behavioral:  Negative for agitation and confusion. The patient is not nervous/anxious.          OBJECTIVE     Physical Exam  Vitals:    03/13/25 0817   BP: 102/74   Pulse: 77   Temp: 97.8 °F (36.6 °C)    Body mass index is 24.41 kg/m².  Weight: 56.7 kg (125 lb)   Height: 5' (152.4 cm)     Physical Exam  Constitutional:       General: She is not in acute distress.     Appearance: She is well-developed.   HENT:      Head: Normocephalic and atraumatic.      Right Ear: External ear normal.      Left Ear: External ear normal.      Nose: Nose normal.   Eyes:      General: No scleral icterus.        Right eye: No discharge.         Left eye: No discharge.      Conjunctiva/sclera: Conjunctivae normal.   Neck:      Vascular: No JVD.      Trachea: No tracheal deviation.   Cardiovascular:      Rate and Rhythm: Normal rate and regular rhythm.      Heart sounds: No murmur heard.     No friction rub. No gallop.   Pulmonary:      Effort: Pulmonary effort is normal. No respiratory distress.      Breath sounds: Normal breath sounds. No wheezing.   Abdominal:      General: Bowel sounds are normal. There is no distension.      Palpations: Abdomen is soft. There is no mass.      Tenderness: There is no abdominal tenderness. There is no guarding or rebound.   Musculoskeletal:         General: No tenderness or deformity. Normal range of motion.      Cervical back: Normal range of motion and neck supple.   Skin:     General: Skin is warm and dry.      Findings: No erythema or rash.   Neurological:      Mental Status: She is alert and oriented to person, place, and time.       Motor: No abnormal muscle tone.      Coordination: Coordination normal.   Psychiatric:         Behavior: Behavior normal.         Thought Content: Thought content normal.         Judgment: Judgment normal.           Health Maintenance         Date Due Completion Date    COVID-19 Vaccine (4 - 2024-25 season) 09/01/2024 12/3/2021    Lipid Panel 03/15/2025 3/15/2024    Cervical Cancer Screening 11/22/2027 11/22/2024    Override on 10/22/2021: Done    TETANUS VACCINE 01/16/2030 1/16/2020    RSV Vaccine (Age 60+ and Pregnant patients) (1 - 1-dose 75+ series) 04/05/2061 ---              ASSESSMENT     39 y.o. female with     1. Annual physical exam    2. Tremor of both hands    3. Pure hypercholesterolemia        PLAN:     1. Annual physical exam  - Counseled on age appropriate medical preventative services, including age appropriate cancer screenings, over all nutritional health, need for a consistent exercise regimen and an over all push towards maintaining a vigorous and active lifestyle.  Counseled on age appropriate vaccines and discussed upcoming health care needs based on age/gender.  Spent time with patient counseling on need for a good patient/doctor relationship moving forward.  Discussed use of common OTC medications and supplements.  Discussed common dietary aids and use of caffeine and the need for good sleep hygiene and stress management.  - CBC Auto Differential; Future  - Comprehensive Metabolic Panel; Future  - Hemoglobin A1C; Future  - TSH; Future  - Lipid Panel; Future    2. Tremor of both hands  - Likely 2/2 an essential tremor, but will check labs as below  - Vitamin B12; Future  - Folate; Future    3. Pure hypercholesterolemia  - Stable; no acute issues  - The current medical regimen is effective;  continue present plan and medications.  - atorvastatin (LIPITOR) 40 MG tablet; Take 1 tablet (40 mg total) by mouth once daily.  Dispense: 90 tablet; Refill: 3        RTC in 1 year     Nguyen Baird,  MD  03/13/2025 8:46 AM        No follow-ups on file.                       [1]   Social History  Socioeconomic History    Marital status:    Tobacco Use    Smoking status: Never    Smokeless tobacco: Never   Substance and Sexual Activity    Alcohol use: No    Drug use: No    Sexual activity: Yes     Partners: Male     Birth control/protection: Implant     Social Drivers of Health     Financial Resource Strain: Low Risk  (3/8/2024)    Overall Financial Resource Strain (CARDIA)     Difficulty of Paying Living Expenses: Not hard at all   Food Insecurity: No Food Insecurity (3/8/2024)    Hunger Vital Sign     Worried About Running Out of Food in the Last Year: Never true     Ran Out of Food in the Last Year: Never true   Transportation Needs: No Transportation Needs (3/8/2024)    PRAPARE - Transportation     Lack of Transportation (Medical): No     Lack of Transportation (Non-Medical): No   Physical Activity: Inactive (3/8/2024)    Exercise Vital Sign     Days of Exercise per Week: 0 days     Minutes of Exercise per Session: 0 min   Stress: No Stress Concern Present (3/8/2024)    [employer] - Occupational Stress Questionnaire     Feeling of Stress : Only a little   Housing Stability: Low Risk  (3/8/2024)    Housing Stability Vital Sign     Unable to Pay for Housing in the Last Year: No     Number of Places Lived in the Last Year: 1     Unstable Housing in the Last Year: No   [2]   Current Outpatient Medications   Medication Sig Dispense Refill    coenzyme Q10 (COQ-10) 100 mg capsule       diphenhydrAMINE (SOMINEX) 25 mg tablet       levonorgestreL (MIRENA) 21 mcg/24 hours (8 yrs) 52 mg IUD       magnesium glycinate 100 mg Tab       multivitamin Tab       penciclovir (DENAVIR) 1 % cream Apply to affected areas every 2 hours while awake, for 4 days 5 g 2    triamcinolone acetonide 0.5% (KENALOG) 0.5 % Crea Apply topically 2 (two) times daily. 15 g 2    valACYclovir (VALTREX) 1000 MG  tablet SMARTSI Tablet(s) By Mouth Morning-Night      atorvastatin (LIPITOR) 40 MG tablet Take 1 tablet (40 mg total) by mouth once daily. 90 tablet 3     No current facility-administered medications for this visit.

## 2025-03-14 ENCOUNTER — RESULTS FOLLOW-UP (OUTPATIENT)
Dept: FAMILY MEDICINE | Facility: CLINIC | Age: 40
End: 2025-03-14

## (undated) DEVICE — DRESSING ABD OPEN GRANUFOAM

## (undated) DEVICE — CLOSURE SKIN STERI STRIP 1/2X4